# Patient Record
Sex: FEMALE | Race: WHITE | Employment: OTHER | URBAN - METROPOLITAN AREA
[De-identification: names, ages, dates, MRNs, and addresses within clinical notes are randomized per-mention and may not be internally consistent; named-entity substitution may affect disease eponyms.]

---

## 2017-02-02 ENCOUNTER — TELEPHONE (OUTPATIENT)
Dept: FAMILY MEDICINE CLINIC | Facility: CLINIC | Age: 67
End: 2017-02-02

## 2017-02-02 ENCOUNTER — HOSPITAL ENCOUNTER (OUTPATIENT)
Age: 67
Discharge: HOME | End: 2017-02-02
Payer: MEDICARE

## 2017-02-02 VITALS
HEART RATE: 76 BPM | OXYGEN SATURATION: 100 % | SYSTOLIC BLOOD PRESSURE: 169 MMHG | DIASTOLIC BLOOD PRESSURE: 75 MMHG | TEMPERATURE: 98 F | RESPIRATION RATE: 16 BRPM

## 2017-02-02 DIAGNOSIS — S16.1XXA STRAIN OF CERVICAL PORTION OF RIGHT TRAPEZIUS MUSCLE: Primary | ICD-10-CM

## 2017-02-02 DIAGNOSIS — S16.1XXA CERVICAL STRAIN, ACUTE, INITIAL ENCOUNTER: ICD-10-CM

## 2017-02-02 PROCEDURE — 99213 OFFICE O/P EST LOW 20 MIN: CPT

## 2017-02-02 PROCEDURE — 99204 OFFICE O/P NEW MOD 45 MIN: CPT

## 2017-02-02 RX ORDER — NAPROXEN 500 MG/1
500 TABLET ORAL 2 TIMES DAILY PRN
Qty: 30 TABLET | Refills: 0 | Status: SHIPPED | OUTPATIENT
Start: 2017-02-02 | End: 2017-02-06

## 2017-02-02 RX ORDER — CYCLOBENZAPRINE HCL 10 MG
10 TABLET ORAL 3 TIMES DAILY PRN
Qty: 15 TABLET | Refills: 0 | Status: SHIPPED | OUTPATIENT
Start: 2017-02-02 | End: 2017-02-06

## 2017-02-02 NOTE — ED PROVIDER NOTES
Patient Seen in: [facility] In [address]    History   Patient presents with:  Headache (neurologic)    Stated Complaint: behind ear swelling/pain    HPI    57-year-old female who comes in today complaining of right right neck pain that exten BESYLATE 10 MG Oral Tab,  TAKE 1 TABLET BY MOUTH DAILY.  BLOOD PRESSURE       Family History   Problem Relation Age of Onset   • Diabetes Father    • Hypertension Mother          Smoking Status: Never Smoker                      Smokeless Status: Never Used intact, no rashes or abnormal dyspigmentation  Neurologic:   Grossly intact, no cranial nerve deficits, 5/5 symmetric UE and LE strength and tone, gait normal    ED Course   Labs Reviewed - No data to display    MDM   Reviewed with patient if continued hea patient verbalized understanding of the discharge instructions and plan.

## 2017-02-02 NOTE — TELEPHONE ENCOUNTER
Patient states that for the past 2 days she has been having a weird tingling sensation behind her right ear that travels down her neck to her right shoulder.    She says that it is not painful, denies nausea, vomiting, vision changes, chest pain, headache,

## 2017-02-05 ENCOUNTER — MOBILE ENCOUNTER (OUTPATIENT)
Dept: OBGYN UNIT | Facility: HOSPITAL | Age: 67
End: 2017-02-05

## 2017-02-06 ENCOUNTER — OFFICE VISIT (OUTPATIENT)
Dept: FAMILY MEDICINE CLINIC | Facility: CLINIC | Age: 67
End: 2017-02-06

## 2017-02-06 VITALS
DIASTOLIC BLOOD PRESSURE: 82 MMHG | HEART RATE: 82 BPM | WEIGHT: 121 LBS | RESPIRATION RATE: 16 BRPM | HEIGHT: 62 IN | TEMPERATURE: 98 F | BODY MASS INDEX: 22.26 KG/M2 | SYSTOLIC BLOOD PRESSURE: 126 MMHG

## 2017-02-06 DIAGNOSIS — M54.2 CERVICAL PAIN: Primary | ICD-10-CM

## 2017-02-06 DIAGNOSIS — M54.2 NECK PAIN: ICD-10-CM

## 2017-02-06 PROCEDURE — 99213 OFFICE O/P EST LOW 20 MIN: CPT | Performed by: PHYSICIAN ASSISTANT

## 2017-02-06 RX ORDER — METHOCARBAMOL 750 MG/1
750 TABLET, FILM COATED ORAL 3 TIMES DAILY
Qty: 30 TABLET | Refills: 0 | Status: SHIPPED | OUTPATIENT
Start: 2017-02-06 | End: 2017-03-07

## 2017-02-06 NOTE — PROGRESS NOTES
[facility] Internal Medicine Progress Note    CC:  Patient presents with:  Headache: x 1-2 weeks  Neck Pain  Shoulder Pain      HPI:   HPI  UC follow-up for neck/shoulder pain  R sided neck pain for 2 weeks  Can feel the area tightening and relea Mouth/Throat: Oropharynx is clear and moist. No oropharyngeal exudate. Eyes: EOM are normal. Pupils are equal, round, and reactive to light. Cardiovascular: Normal rate, regular rhythm and normal heart sounds.   Exam reveals no gallop and no friction ru PAC (premature atrial contraction)     SVT (supraventricular tachycardia) (HCC)     TOD (obstructive sleep apnea)     Foot fracture, left     Urine frequency     Ankle swelling     Sinusitis     Abnormal LFTs     Arthritis of right knee

## 2017-02-06 NOTE — PATIENT INSTRUCTIONS
Thank you for choosing Horald Mcardle, PA-C at [place] [address]  To Do: Janelle Jaimes  1. Pt to start physical therapy  2. Begin medications, OTC acetaminophen/ibuprofen  3.  Pt to follow-up in 1 month    • Please signup for MY CHART, which is yaritza you healthier and to improve your quality of life.     Referrals, and Radiology Information:    If your insurance requires a referral to a specialist, please allow 5 business days to process your referral request.    If Eva Prakash PA-C orders a CT or

## 2017-02-13 ENCOUNTER — APPOINTMENT (OUTPATIENT)
Dept: PHYSICAL THERAPY | Age: 67
End: 2017-02-13
Attending: PHYSICIAN ASSISTANT
Payer: MEDICARE

## 2017-02-13 ENCOUNTER — TELEPHONE (OUTPATIENT)
Dept: PHYSICAL THERAPY | Age: 67
End: 2017-02-13

## 2017-02-15 ENCOUNTER — OFFICE VISIT (OUTPATIENT)
Dept: PHYSICAL THERAPY | Age: 67
End: 2017-02-15
Attending: PHYSICIAN ASSISTANT
Payer: MEDICARE

## 2017-02-15 DIAGNOSIS — M54.2 CERVICAL PAIN: Primary | ICD-10-CM

## 2017-02-15 PROCEDURE — 97140 MANUAL THERAPY 1/> REGIONS: CPT | Performed by: PHYSICAL THERAPIST

## 2017-02-15 PROCEDURE — 97161 PT EVAL LOW COMPLEX 20 MIN: CPT | Performed by: PHYSICAL THERAPIST

## 2017-02-15 NOTE — PROGRESS NOTES
SPINE EVALUATION:   Referring Physician: Robert Vizcaino PA-C  Diagnosis: Cervical pain (M54.2) Date of Service: 2/15/2017     PATIENT SUMMARY   Doris Burkett is a 61year old y/o female who presents to therapy today with complaints of right sided and myotomes WNL    cervica ROM:   Flexion: 45 deg stretching pain  Extension: 35 deg right sided neck pain  Sidebending: R 30 deg L 25 deg pain  Rotation: R 80 ; L 80 deg     Palpation: moderate tenderness to right sided occipital musculature.     Strength visits)  · Pt will be independent and compliant with comprehensive HEP to maintain progress achieved in PT (4 visits)    Frequency / Duration: Patient will be seen for 1-2 x/week or a total of 4 visits over a 90 day period.  Treatment will include: Manual T

## 2017-02-20 ENCOUNTER — APPOINTMENT (OUTPATIENT)
Dept: PHYSICAL THERAPY | Age: 67
End: 2017-02-20
Attending: PHYSICIAN ASSISTANT
Payer: MEDICARE

## 2017-02-22 ENCOUNTER — APPOINTMENT (OUTPATIENT)
Dept: PHYSICAL THERAPY | Age: 67
End: 2017-02-22
Attending: PHYSICIAN ASSISTANT
Payer: MEDICARE

## 2017-03-01 ENCOUNTER — APPOINTMENT (OUTPATIENT)
Dept: PHYSICAL THERAPY | Age: 67
End: 2017-03-01
Attending: PHYSICIAN ASSISTANT
Payer: MEDICARE

## 2017-03-07 ENCOUNTER — HOSPITAL ENCOUNTER (OUTPATIENT)
Dept: GENERAL RADIOLOGY | Age: 67
Discharge: HOME | End: 2017-03-07
Attending: INTERNAL MEDICINE
Payer: MEDICARE

## 2017-03-07 ENCOUNTER — OFFICE VISIT (OUTPATIENT)
Dept: FAMILY MEDICINE CLINIC | Facility: CLINIC | Age: 67
End: 2017-03-07

## 2017-03-07 VITALS
RESPIRATION RATE: 18 BRPM | DIASTOLIC BLOOD PRESSURE: 70 MMHG | HEART RATE: 108 BPM | TEMPERATURE: 99 F | HEIGHT: 62 IN | BODY MASS INDEX: 21.53 KG/M2 | WEIGHT: 117 LBS | SYSTOLIC BLOOD PRESSURE: 130 MMHG

## 2017-03-07 DIAGNOSIS — R50.9 FEVER, UNSPECIFIED FEVER CAUSE: ICD-10-CM

## 2017-03-07 DIAGNOSIS — R50.9 FEVER, UNSPECIFIED FEVER CAUSE: Primary | ICD-10-CM

## 2017-03-07 DIAGNOSIS — R05.9 COUGH: ICD-10-CM

## 2017-03-07 PROCEDURE — 87798 DETECT AGENT NOS DNA AMP: CPT | Performed by: INTERNAL MEDICINE

## 2017-03-07 PROCEDURE — 71020 XR CHEST PA + LAT CHEST (CPT=71020): CPT

## 2017-03-07 PROCEDURE — 99213 OFFICE O/P EST LOW 20 MIN: CPT | Performed by: INTERNAL MEDICINE

## 2017-03-07 PROCEDURE — 87502 INFLUENZA DNA AMP PROBE: CPT | Performed by: INTERNAL MEDICINE

## 2017-03-07 RX ORDER — AZITHROMYCIN 250 MG/1
TABLET, FILM COATED ORAL
Qty: 6 TABLET | Refills: 0 | Status: SHIPPED | OUTPATIENT
Start: 2017-03-07 | End: 2017-06-07

## 2017-03-07 NOTE — PROGRESS NOTES
[facility] Internal Medicine Office Note  Chief Complaint:   Patient presents with:  Cough: x 1week  Fever: x 1week       HPI:   This is a 61year old female coming in for cough and fever for 1 week.    +rhinorrhea and congestion  +ears popping  + eyes every 6 (six) hours. Disp: 1 Bottle Rfl: 0   GALANTAMINE HYDROBROMIDE ER 24 MG Oral Capsule SR 24 Hr TAKE 1 CAPSULE (24 MG TOTAL) BY MOUTH DAILY WITH BREAKFAST. Disp: 30 capsule Rfl: 12   Aspirin (ASPIR-81 OR) Take  by mouth.  Disp:  Rfl:          REVI sounds but explained we will stop abx if influenza swab is positive. -     XR CHEST PA + LAT CHEST (CPT=71020); Future  -     Influenza A/B PCR [E]; Future    Cough  -     XR CHEST PA + LAT CHEST (CPT=71020);  Future    Other orders  -     azithromycin 25

## 2017-03-07 NOTE — PATIENT INSTRUCTIONS
Thank you for choosing Mac Isabel MD at [place] [address]  To Do: Teddy Gee  1.  Chest X-ray today    • Please signup for MY CHART, which is electronic access to your record if you have not done so.  All your results will post on there.  Https business days to contact you regarding any testing results. Refill policies:   Allow 3 business days for refills; controlled substances may take longer and must be picked up from the office in person.   Narcotic medications can only be filled in 30 day i

## 2017-03-08 ENCOUNTER — TELEPHONE (OUTPATIENT)
Dept: FAMILY MEDICINE CLINIC | Facility: CLINIC | Age: 67
End: 2017-03-08

## 2017-03-08 ENCOUNTER — APPOINTMENT (OUTPATIENT)
Dept: PHYSICAL THERAPY | Age: 67
End: 2017-03-08
Attending: PHYSICIAN ASSISTANT
Payer: MEDICARE

## 2017-03-08 NOTE — TELEPHONE ENCOUNTER
Pt had x ray completed yesterday 3/7/17, it has not been resulted by the provider yet. Please call pt / daughter and let her know we will call with the results / instructions as soon as we receive them from the provider. Thank you.

## 2017-03-10 ENCOUNTER — APPOINTMENT (OUTPATIENT)
Dept: PHYSICAL THERAPY | Age: 67
End: 2017-03-10
Attending: PHYSICIAN ASSISTANT
Payer: MEDICARE

## 2017-03-10 ENCOUNTER — TELEPHONE (OUTPATIENT)
Dept: FAMILY MEDICINE CLINIC | Facility: CLINIC | Age: 67
End: 2017-03-10

## 2017-03-10 NOTE — TELEPHONE ENCOUNTER
Per earlier call from [facility] (daughter - ASIA KEITH). Patient has memory issues, so [facility] is asking that all calls be directed to her at [phone]. [facility] states that she has POA.   However, without a md declaration that the patient is not capable of ta

## 2017-05-22 ENCOUNTER — CHARTING TRANS (OUTPATIENT)
Dept: OTHER | Age: 67
End: 2017-05-22

## 2017-05-31 ENCOUNTER — TELEPHONE (OUTPATIENT)
Dept: FAMILY MEDICINE CLINIC | Facility: CLINIC | Age: 67
End: 2017-05-31

## 2017-05-31 NOTE — TELEPHONE ENCOUNTER
Patient states that she is not sure if she is having allergies, states she has an upset stomach and headache, nasal congestion, sore throat, pt states this started 3-4 days. Patient states she has taken tylenol and nasal spray.  Patient scheduled an appt to

## 2017-05-31 NOTE — TELEPHONE ENCOUNTER
Patients daughter returned call was upset that an appointment was set up with another provider, pts daughter states [de-identified] my mother can not see Dr Pratima Mueller than they will seek another provider who has better availability\" Informed patients daughter we would dis

## 2017-06-01 NOTE — TELEPHONE ENCOUNTER
Pts daughter called back, offered for pt to see PA, she declined apt. Advised her next available is in early July. Daughter sts she may have to look else where for another MD.  Call ended.

## 2017-06-01 NOTE — TELEPHONE ENCOUNTER
You can schedule her further out    Or I accept the arrangement and she can find another provider i'm good with that option thanks

## 2017-06-01 NOTE — TELEPHONE ENCOUNTER
Attempted to contact Aleshia to discuss phone rings for several minutes before disconnecting, unable to leave a message.

## 2017-06-07 ENCOUNTER — TELEPHONE (OUTPATIENT)
Dept: FAMILY MEDICINE CLINIC | Facility: CLINIC | Age: 67
End: 2017-06-07

## 2017-06-07 ENCOUNTER — OFFICE VISIT (OUTPATIENT)
Dept: FAMILY MEDICINE CLINIC | Facility: CLINIC | Age: 67
End: 2017-06-07

## 2017-06-07 VITALS
SYSTOLIC BLOOD PRESSURE: 110 MMHG | HEIGHT: 62 IN | RESPIRATION RATE: 16 BRPM | HEART RATE: 58 BPM | DIASTOLIC BLOOD PRESSURE: 60 MMHG | BODY MASS INDEX: 21.35 KG/M2 | OXYGEN SATURATION: 99 % | WEIGHT: 116 LBS

## 2017-06-07 DIAGNOSIS — F02.80 EARLY ONSET ALZHEIMER'S DEMENTIA WITHOUT BEHAVIORAL DISTURBANCE (HCC): ICD-10-CM

## 2017-06-07 DIAGNOSIS — I10 ESSENTIAL HYPERTENSION: ICD-10-CM

## 2017-06-07 DIAGNOSIS — R79.89 ABNORMAL LFTS: ICD-10-CM

## 2017-06-07 DIAGNOSIS — Z12.11 COLON CANCER SCREENING: ICD-10-CM

## 2017-06-07 DIAGNOSIS — M54.50 ACUTE RIGHT-SIDED LOW BACK PAIN WITHOUT SCIATICA: Primary | ICD-10-CM

## 2017-06-07 DIAGNOSIS — G30.0 EARLY ONSET ALZHEIMER'S DEMENTIA WITHOUT BEHAVIORAL DISTURBANCE (HCC): ICD-10-CM

## 2017-06-07 DIAGNOSIS — K76.9 HEPATIC LESION: ICD-10-CM

## 2017-06-07 PROCEDURE — 99214 OFFICE O/P EST MOD 30 MIN: CPT | Performed by: FAMILY MEDICINE

## 2017-06-07 RX ORDER — CYCLOBENZAPRINE HCL 5 MG
5 TABLET ORAL NIGHTLY
Qty: 30 TABLET | Refills: 0 | Status: SHIPPED | OUTPATIENT
Start: 2017-06-07 | End: 2017-07-07

## 2017-06-07 RX ORDER — PREDNISONE 20 MG/1
20 TABLET ORAL DAILY
Qty: 5 TABLET | Refills: 0 | Status: SHIPPED | OUTPATIENT
Start: 2017-06-07 | End: 2017-06-12

## 2017-06-07 NOTE — PROGRESS NOTES
[facility] Family Medicine Office Note  Chief Complaint:   Patient presents with:  Establish Care: estab care, lower back pain started yesterday       HPI:   This is a 61year old female coming in for  HPI  Back pain   The patient complaints of b Mother      Allergies:    Diovan [Valsartan]        Sulfa Antibiotics           Comment:tachycardia  Current Meds:    Current Outpatient Prescriptions:  Cyclobenzaprine HCl 5 MG Oral Tab Take 1 tablet (5 mg total) by mouth nightly.  Disp: 30 tablet Rfl: 0 Effort normal and breath sounds normal. She has no wheezes. She has no rales. Abdominal: Soft. Bowel sounds are normal. There is no tenderness.    Musculoskeletal:        Right hip: Normal.        Left hip: Normal.        Lumbar back: She exhibits no defo questions, complications, allergies, or worsening or changing symptoms. Patient is to call with any side effects or complications from the treatments as a result of today.      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

## 2017-06-07 NOTE — PATIENT INSTRUCTIONS
Back Exercises: Back Press    Do this exercise on your hands and knees. Keep your knees under your hips and your hands under your shoulders. Keep your spine in a neutral position (not arched or sagging).  Be sure to maintain your neck’s natural curve:  · © 9691-2810 The [address] [place] [address], [address] [place] [address] [place]. All rights reserved. This information is not intended as a substitute for professional medical care. Always follow your healthcare professional's instructions.         Back Ex

## 2017-06-08 NOTE — TELEPHONE ENCOUNTER
I called Christofer regarding a PA for Cyclobenzaprine. Cost will be 31$ if not through insurance. She gave me phone # [phone], ID [phone]. I called [address] spoke with Meena 19 did PA over the phone.   Will await decision, she states it will take 67

## 2017-06-09 NOTE — TELEPHONE ENCOUNTER
Fax received from Barbra Shantanu asking additional questions regarding pt's PA. Questions answered & form faxed back to [facility]. Confirmation received. Will await decision.

## 2017-06-22 NOTE — TELEPHONE ENCOUNTER
PSR: When patient returns call, please let her know (if she doesn't already) that cyclobenzaprine approved by insurance. Thank you! Fax received from Ladonna Alonso that cyclobenzaprine approved. Left message for patient to call back.  Does not appear that she seven

## 2017-06-30 ENCOUNTER — OFFICE VISIT (OUTPATIENT)
Dept: FAMILY MEDICINE CLINIC | Facility: CLINIC | Age: 67
End: 2017-06-30

## 2017-06-30 ENCOUNTER — LAB ENCOUNTER (OUTPATIENT)
Dept: LAB | Age: 67
End: 2017-06-30
Attending: FAMILY MEDICINE
Payer: MEDICARE

## 2017-06-30 ENCOUNTER — TELEPHONE (OUTPATIENT)
Dept: FAMILY MEDICINE CLINIC | Facility: CLINIC | Age: 67
End: 2017-06-30

## 2017-06-30 ENCOUNTER — HOSPITAL ENCOUNTER (OUTPATIENT)
Dept: ULTRASOUND IMAGING | Age: 67
Discharge: HOME | End: 2017-06-30
Attending: FAMILY MEDICINE
Payer: MEDICARE

## 2017-06-30 VITALS
HEART RATE: 70 BPM | TEMPERATURE: 98 F | RESPIRATION RATE: 16 BRPM | DIASTOLIC BLOOD PRESSURE: 80 MMHG | SYSTOLIC BLOOD PRESSURE: 136 MMHG | OXYGEN SATURATION: 99 % | BODY MASS INDEX: 21 KG/M2 | WEIGHT: 115 LBS

## 2017-06-30 DIAGNOSIS — R79.89 ABNORMAL LFTS: ICD-10-CM

## 2017-06-30 DIAGNOSIS — I10 ESSENTIAL HYPERTENSION: ICD-10-CM

## 2017-06-30 DIAGNOSIS — G30.0 EARLY ONSET ALZHEIMER'S DEMENTIA WITHOUT BEHAVIORAL DISTURBANCE (HCC): ICD-10-CM

## 2017-06-30 DIAGNOSIS — F02.80 EARLY ONSET ALZHEIMER'S DEMENTIA WITHOUT BEHAVIORAL DISTURBANCE (HCC): ICD-10-CM

## 2017-06-30 DIAGNOSIS — H81.10 BPPV (BENIGN PAROXYSMAL POSITIONAL VERTIGO), UNSPECIFIED LATERALITY: Primary | ICD-10-CM

## 2017-06-30 DIAGNOSIS — K76.9 HEPATIC LESION: ICD-10-CM

## 2017-06-30 DIAGNOSIS — R42 DIZZINESS: ICD-10-CM

## 2017-06-30 LAB
ALBUMIN SERPL-MCNC: 3.7 G/DL (ref 3.5–4.8)
ALP LIVER SERPL-CCNC: 104 U/L (ref 50–130)
ALT SERPL-CCNC: 20 U/L (ref 14–54)
AST SERPL-CCNC: 16 U/L (ref 15–41)
BASOPHILS # BLD AUTO: 0.02 X10(3) UL (ref 0–0.1)
BASOPHILS NFR BLD AUTO: 0.4 %
BILIRUB DIRECT SERPL-MCNC: 0.1 MG/DL (ref 0.1–0.5)
BILIRUB SERPL-MCNC: 0.5 MG/DL (ref 0.1–2)
BUN BLD-MCNC: 24 MG/DL (ref 8–20)
CALCIUM BLD-MCNC: 8.9 MG/DL (ref 8.3–10.3)
CHLORIDE: 108 MMOL/L (ref 101–111)
CO2: 27 MMOL/L (ref 22–32)
CREAT BLD-MCNC: 0.81 MG/DL (ref 0.55–1.02)
EOSINOPHIL # BLD AUTO: 0.22 X10(3) UL (ref 0–0.3)
EOSINOPHIL NFR BLD AUTO: 4 %
ERYTHROCYTE [DISTWIDTH] IN BLOOD BY AUTOMATED COUNT: 14.1 % (ref 11.5–16)
GLUCOSE BLD-MCNC: 91 MG/DL (ref 70–99)
HCT VFR BLD AUTO: 41.7 % (ref 34–50)
HGB BLD-MCNC: 13.5 G/DL (ref 12–16)
IMMATURE GRANULOCYTE COUNT: 0.01 X10(3) UL (ref 0–1)
IMMATURE GRANULOCYTE RATIO %: 0.2 %
LYMPHOCYTES # BLD AUTO: 2.13 X10(3) UL (ref 0.9–4)
LYMPHOCYTES NFR BLD AUTO: 38.9 %
M PROTEIN MFR SERPL ELPH: 6.9 G/DL (ref 6.1–8.3)
MCH RBC QN AUTO: 28.1 PG (ref 27–33.2)
MCHC RBC AUTO-ENTMCNC: 32.4 G/DL (ref 31–37)
MCV RBC AUTO: 86.9 FL (ref 81–100)
MONOCYTES # BLD AUTO: 0.54 X10(3) UL (ref 0.1–0.6)
MONOCYTES NFR BLD AUTO: 9.9 %
NEUTROPHIL ABS PRELIM: 2.56 X10 (3) UL (ref 1.3–6.7)
NEUTROPHILS # BLD AUTO: 2.56 X10(3) UL (ref 1.3–6.7)
NEUTROPHILS NFR BLD AUTO: 46.6 %
PLATELET # BLD AUTO: 227 10(3)UL (ref 150–450)
POTASSIUM SERPL-SCNC: 4.5 MMOL/L (ref 3.6–5.1)
RBC # BLD AUTO: 4.8 X10(6)UL (ref 3.8–5.1)
RED CELL DISTRIBUTION WIDTH-SD: 45.1 FL (ref 35.1–46.3)
SODIUM SERPL-SCNC: 142 MMOL/L (ref 136–144)
TSI SER-ACNC: 1.44 MIU/ML (ref 0.35–5.5)
WBC # BLD AUTO: 5.5 X10(3) UL (ref 4–13)

## 2017-06-30 PROCEDURE — 99213 OFFICE O/P EST LOW 20 MIN: CPT | Performed by: PHYSICIAN ASSISTANT

## 2017-06-30 PROCEDURE — 76700 US EXAM ABDOM COMPLETE: CPT | Performed by: FAMILY MEDICINE

## 2017-06-30 PROCEDURE — 82962 GLUCOSE BLOOD TEST: CPT | Performed by: PHYSICIAN ASSISTANT

## 2017-06-30 PROCEDURE — 36415 COLL VENOUS BLD VENIPUNCTURE: CPT

## 2017-06-30 PROCEDURE — 80076 HEPATIC FUNCTION PANEL: CPT

## 2017-06-30 PROCEDURE — 80048 BASIC METABOLIC PNL TOTAL CA: CPT

## 2017-06-30 PROCEDURE — 85025 COMPLETE CBC W/AUTO DIFF WBC: CPT

## 2017-06-30 PROCEDURE — 84443 ASSAY THYROID STIM HORMONE: CPT

## 2017-06-30 RX ORDER — MECLIZINE HYDROCHLORIDE 25 MG/1
25 TABLET ORAL 3 TIMES DAILY PRN
Qty: 21 TABLET | Refills: 0 | Status: SHIPPED | OUTPATIENT
Start: 2017-06-30 | End: 2017-07-07

## 2017-06-30 RX ORDER — FLUTICASONE PROPIONATE 50 MCG
2 SPRAY, SUSPENSION (ML) NASAL DAILY
Qty: 1 INHALER | Refills: 0 | Status: SHIPPED | OUTPATIENT
Start: 2017-06-30 | End: 2017-07-07

## 2017-06-30 NOTE — PATIENT INSTRUCTIONS
-If you have any chest pain, shortness of breath, or palpitations, or any worsening symptoms, go to ER immediately.  -Follow up with ENT       Benign Paroxysmal Positional Vertigo  Benign paroxysmal positional vertigo (BPPV) is a problem with the inner ear The health care provider will ask about your symptoms and your medical history. He or she will examine you. You may have hearing and balance tests. As part of the exam, your health care provider may have you move your head and body in certain ways.  If you

## 2017-06-30 NOTE — TELEPHONE ENCOUNTER
Refer to [facility] home health for   1.  Physical therapy    Dx: fall risk, BPPV

## 2017-06-30 NOTE — PROGRESS NOTES
Patient reports dizziness x 1.5week. Patient was laying down and turned and started again. Patient does report a lot  Of \"popping in her ears. \"  Patient reports when she turns her head she gets really dizzy.   Patient reports that she goes from a lyin

## 2017-06-30 NOTE — TELEPHONE ENCOUNTER
Demographics sheet and [facility] referral form completed and pending MD signature. Will kaden for f/u and fax to [facility] once signed.

## 2017-06-30 NOTE — PROGRESS NOTES
CHIEF COMPLAINT:     Patient presents with:  Dizziness: Almost 2 weeks.  :      HPI:     Jae Machado is a 61year old female presents with complaints of dizziness and ear popping. Patient has had symptoms for 10 days.    Patient has had symptoms like • TOD (obstructive sleep apnea) 4/10/2015   • S/P right knee arthroscopy       Social History:  Smoking status: Never Smoker                                                              Smokeless tobacco: Never Used                      Alcohol use:  No PSYCH:  Speech, mood and affect are appropriate.        Orthostatic Vitals:   Lying:      /80  HR 76     Sitting:     /80  HR 72   Standing: /80  HR 80    Blood sugar 109    ASSESSMENT AND PLAN:   ASSESSMENT:   Bppv (benign paroxysmal posi Benign paroxysmal positional vertigo (BPPV) is a problem with the inner ear. The inner ear contains the vestibular system. This system is what helps you keep your balance. BPPV causes a feeling of spinning. It is a common problem of the vestibular system. Your health care provider may try to move the calcium crystals. This is done by having you move your head and neck in certain ways. This treatment is safe and often works well. You may also be told to do these movements at home.  You may still have vertigo

## 2017-07-01 ENCOUNTER — HOSPITAL ENCOUNTER (EMERGENCY)
Age: 67
Discharge: HOME | End: 2017-07-01
Attending: EMERGENCY MEDICINE
Payer: MEDICARE

## 2017-07-01 VITALS
HEIGHT: 61 IN | BODY MASS INDEX: 21.71 KG/M2 | OXYGEN SATURATION: 100 % | SYSTOLIC BLOOD PRESSURE: 182 MMHG | HEART RATE: 80 BPM | RESPIRATION RATE: 16 BRPM | TEMPERATURE: 98 F | WEIGHT: 115 LBS | DIASTOLIC BLOOD PRESSURE: 81 MMHG

## 2017-07-01 DIAGNOSIS — H81.10 BPPV (BENIGN PAROXYSMAL POSITIONAL VERTIGO), UNSPECIFIED LATERALITY: Primary | ICD-10-CM

## 2017-07-01 PROCEDURE — 99283 EMERGENCY DEPT VISIT LOW MDM: CPT | Performed by: EMERGENCY MEDICINE

## 2017-07-01 RX ORDER — DIAZEPAM 2 MG/1
2 TABLET ORAL 3 TIMES DAILY PRN
Qty: 20 TABLET | Refills: 0 | Status: SHIPPED | OUTPATIENT
Start: 2017-07-01 | End: 2017-07-07

## 2017-07-01 NOTE — ED INITIAL ASSESSMENT (HPI)
Pt states has been dizzy for 2 weeks, intermittent, states has had vertigo in the past, dizziness worse when changing positions. Went to walk in clinic last night, was told to take dramamine and states feels better when walking today.

## 2017-07-01 NOTE — ED PROVIDER NOTES
Patient Seen in: [facility] In [place]    History   Patient presents with:  Dizziness (neurologic)    Stated Complaint: \"DIZZY X 2 WEEKS\"    HPI    62-year-old female, history of Alzheimer's and according to the daughter BPPV, here Father    • Hypertension Mother        Smoking status: Never Smoker                                                              Smokeless tobacco: Never Used                      Alcohol use:  No                Review of Systems    Positive for stated comp to nose. Normal heel to shin. Normal gait. Sensation is normal in the lower extremities and not diminished. It is intact to fine touch. There is no lower extremity drift.     There is full strength with hip flexion, knee flexion and extension, ankl

## 2017-07-03 ENCOUNTER — TELEPHONE (OUTPATIENT)
Dept: FAMILY MEDICINE CLINIC | Facility: CLINIC | Age: 67
End: 2017-07-03

## 2017-07-03 NOTE — TELEPHONE ENCOUNTER
Please see above message. Also, see other telephone encounter. They are unable to start until Wednesday if patient agrees. Please advise. Thank you!

## 2017-07-05 NOTE — TELEPHONE ENCOUNTER
[facility]'s VM.

## 2017-07-05 NOTE — TELEPHONE ENCOUNTER
Cancel home health referral. Daughter does not want at this time. Concerned due to patient's dementia that she will not remember tx provided and additionally be uncomfortable with strangers present at home.

## 2017-07-07 ENCOUNTER — APPOINTMENT (OUTPATIENT)
Dept: LAB | Age: 67
End: 2017-07-07
Attending: FAMILY MEDICINE
Payer: MEDICARE

## 2017-07-07 ENCOUNTER — OFFICE VISIT (OUTPATIENT)
Dept: FAMILY MEDICINE CLINIC | Facility: CLINIC | Age: 67
End: 2017-07-07

## 2017-07-07 VITALS
BODY MASS INDEX: 20.8 KG/M2 | WEIGHT: 113 LBS | OXYGEN SATURATION: 98 % | TEMPERATURE: 98 F | HEART RATE: 68 BPM | DIASTOLIC BLOOD PRESSURE: 74 MMHG | HEIGHT: 62 IN | RESPIRATION RATE: 16 BRPM | SYSTOLIC BLOOD PRESSURE: 110 MMHG

## 2017-07-07 DIAGNOSIS — D13.4 BENIGN NEOPLASM OF LIVER: ICD-10-CM

## 2017-07-07 DIAGNOSIS — Z12.11 COLON CANCER SCREENING: ICD-10-CM

## 2017-07-07 DIAGNOSIS — R63.4 WEIGHT LOSS: Primary | ICD-10-CM

## 2017-07-07 DIAGNOSIS — K76.9 HEPATIC LESION: ICD-10-CM

## 2017-07-07 DIAGNOSIS — Z12.31 ENCOUNTER FOR SCREENING MAMMOGRAM FOR MALIGNANT NEOPLASM OF BREAST: ICD-10-CM

## 2017-07-07 LAB — AFP TUMOR MARKER: 6.6 NG/ML (ref ?–8)

## 2017-07-07 PROCEDURE — 82105 ALPHA-FETOPROTEIN SERUM: CPT

## 2017-07-07 PROCEDURE — 36415 COLL VENOUS BLD VENIPUNCTURE: CPT

## 2017-07-07 PROCEDURE — 99214 OFFICE O/P EST MOD 30 MIN: CPT | Performed by: FAMILY MEDICINE

## 2017-07-07 RX ORDER — MIRTAZAPINE 15 MG/1
15 TABLET, FILM COATED ORAL NIGHTLY
Qty: 30 TABLET | Refills: 0 | Status: SHIPPED | OUTPATIENT
Start: 2017-07-07 | End: 2017-10-24

## 2017-07-09 NOTE — PROGRESS NOTES
[facility] Family Medicine Office Note  Chief Complaint:   Patient presents with: Follow - Up: weight loss and dizzy spells also go over results       HPI:   This is a 61year old female coming in for  HPI  Follow up   1.  Vertigo  - was seen in Outpatient Prescriptions:  mirtazapine 15 MG Oral Tab Take 1 tablet (15 mg total) by mouth nightly. Disp: 30 tablet Rfl: 0   GALANTAMINE HYDROBROMIDE ER 24 MG Oral Capsule SR 24 Hr TAKE 1 CAPSULE (24 MG TOTAL) BY MOUTH DAILY WITH BREAKFAST.  Disp: 30 capsul has no rales. Lymphadenopathy:     She has no cervical adenopathy. Neurological: She is alert. She has normal reflexes. No cranial nerve deficit. Coordination normal.   Psychiatric: She has a normal mood and affect.          ASSESSMENT AND PLAN:   1. We

## 2017-10-06 ENCOUNTER — TELEPHONE (OUTPATIENT)
Dept: FAMILY MEDICINE CLINIC | Facility: CLINIC | Age: 67
End: 2017-10-06

## 2017-10-06 NOTE — TELEPHONE ENCOUNTER
Dr.Jabbar- [facility] is calling to speak with a nurse in regards to a refill request for GALANTAMINE HYDROBROMIDE ER 24 MG Oral Capsule SR 24 . Patient is waiting at the pharmacy. Please advise.

## 2017-10-06 NOTE — TELEPHONE ENCOUNTER
Informed the pharmacy that this patient no longer has a PCP at this office. She will need to contact patient's current PCP office.

## 2017-10-09 NOTE — TELEPHONE ENCOUNTER
Patient's daughter call for a refill for her mom for  GALANTAMINE HYDROBROMIDE ER 24 MG Oral Capsule SR 24 Hr 30 capsule     Please advise pt when completed.

## 2017-10-10 RX ORDER — GALANTAMINE HYDROBROMIDE 24 MG/1
CAPSULE, EXTENDED RELEASE ORAL
Qty: 30 CAPSULE | Refills: 5 | Status: SHIPPED | OUTPATIENT
Start: 2017-10-10 | End: 2018-05-06

## 2017-10-16 ENCOUNTER — HOSPITAL ENCOUNTER (OUTPATIENT)
Dept: MRI IMAGING | Age: 67
Discharge: HOME | End: 2017-10-16
Payer: MEDICARE

## 2017-10-16 DIAGNOSIS — F02.80 ALZHEIMER'S DISEASE, EARLY ONSET (HCC): ICD-10-CM

## 2017-10-16 DIAGNOSIS — R42 DIZZINESS: ICD-10-CM

## 2017-10-16 DIAGNOSIS — F02.80 DEMENTIA OF THE ALZHEIMER'S TYPE WITHOUT BEHAVIORAL DISTURBANCE (HCC): ICD-10-CM

## 2017-10-16 DIAGNOSIS — G30.0 ALZHEIMER'S DISEASE, EARLY ONSET (HCC): ICD-10-CM

## 2017-10-16 DIAGNOSIS — G30.9 DEMENTIA OF THE ALZHEIMER'S TYPE WITHOUT BEHAVIORAL DISTURBANCE (HCC): ICD-10-CM

## 2017-10-16 PROCEDURE — 70551 MRI BRAIN STEM W/O DYE: CPT | Performed by: PHYSICIAN ASSISTANT

## 2017-10-16 PROCEDURE — 70551 MRI BRAIN STEM W/O DYE: CPT

## 2017-10-19 ENCOUNTER — HOSPITAL ENCOUNTER (OUTPATIENT)
Dept: MAMMOGRAPHY | Age: 67
Discharge: HOME | End: 2017-10-19
Attending: FAMILY MEDICINE
Payer: MEDICARE

## 2017-10-19 ENCOUNTER — TELEPHONE (OUTPATIENT)
Dept: FAMILY MEDICINE CLINIC | Facility: CLINIC | Age: 67
End: 2017-10-19

## 2017-10-19 DIAGNOSIS — Z12.31 ENCOUNTER FOR SCREENING MAMMOGRAM FOR MALIGNANT NEOPLASM OF BREAST: ICD-10-CM

## 2017-10-19 PROCEDURE — 77067 SCR MAMMO BI INCL CAD: CPT | Performed by: FAMILY MEDICINE

## 2017-10-25 RX ORDER — MIRTAZAPINE 15 MG/1
TABLET, FILM COATED ORAL
Qty: 30 TABLET | Refills: 5 | Status: SHIPPED | OUTPATIENT
Start: 2017-10-25 | End: 2017-10-27

## 2017-10-25 NOTE — TELEPHONE ENCOUNTER
Medication(s) to Refill:   Pending Prescriptions Disp Refills    MIRTAZAPINE 15 MG Oral Tab [Pharmacy Med Name: MIRTAZAPINE 15MG TABLETS] 30 tablet 0     Sig: TAKE 1 TABLET(15 MG) BY MOUTH EVERY NIGHT           Last Time Medication was Filled: 7/7/2017

## 2017-10-27 ENCOUNTER — APPOINTMENT (OUTPATIENT)
Dept: GENERAL RADIOLOGY | Age: 67
End: 2017-10-27
Attending: EMERGENCY MEDICINE
Payer: MEDICARE

## 2017-10-27 PROCEDURE — 71020 XR CHEST PA + LAT CHEST (CPT=71020): CPT | Performed by: EMERGENCY MEDICINE

## 2017-10-27 NOTE — ED PROVIDER NOTES
Patient Seen in: [facility] In [place]    History   Patient presents with:  Hypertension (cardiovascular)  Rash Skin Problem (integumentary)    Stated Complaint: htn and finger discoloration     HPI    53-year-old woman with a h (36.5 °C) [10/27/17 1308]  Temp src: Oral [10/27/17 1308]  SpO2: 99 % [10/27/17 1308]  O2 Device: None (Room air) [10/27/17 1308]    Current:BP (!) 166/63   Pulse 62   Temp 97.7 °F (36.5 °C) (Temporal)   Resp 17   Ht 154.9 cm (5' 1\")   Wt 51.3 kg   LMP  ( ============================================================  ED Course  ------------------------------------------------------------  MDM     Patient given warm pack to her left hand.   She is accompanied by her daughter who is an occupational therapis

## 2017-10-27 NOTE — ED NOTES
Unable to insert iv 3 attempts, pt wrapped in warm blanket and given warm packs, pt rready for xray and md aware

## 2017-12-05 ENCOUNTER — TELEPHONE (OUTPATIENT)
Dept: FAMILY MEDICINE CLINIC | Facility: CLINIC | Age: 67
End: 2017-12-05

## 2018-02-28 ENCOUNTER — OFFICE VISIT (OUTPATIENT)
Dept: FAMILY MEDICINE CLINIC | Facility: CLINIC | Age: 68
End: 2018-02-28

## 2018-02-28 VITALS
SYSTOLIC BLOOD PRESSURE: 126 MMHG | OXYGEN SATURATION: 99 % | RESPIRATION RATE: 16 BRPM | HEIGHT: 62 IN | HEART RATE: 62 BPM | TEMPERATURE: 98 F | DIASTOLIC BLOOD PRESSURE: 64 MMHG | WEIGHT: 113 LBS | BODY MASS INDEX: 20.8 KG/M2

## 2018-02-28 DIAGNOSIS — H11.31 SUBCONJUNCTIVAL HEMORRHAGE OF RIGHT EYE: ICD-10-CM

## 2018-02-28 DIAGNOSIS — S03.00XA TMJ (DISLOCATION OF TEMPOROMANDIBULAR JOINT), INITIAL ENCOUNTER: Primary | ICD-10-CM

## 2018-02-28 PROCEDURE — 99214 OFFICE O/P EST MOD 30 MIN: CPT | Performed by: PHYSICIAN ASSISTANT

## 2018-03-01 NOTE — PATIENT INSTRUCTIONS
Subconjunctival hemorrhage  -If see red, have eye pain, itchy eye, blurred vision, discharge, or any other worsening symptoms, must see eye doctor or ER immediately  TMJ  -Alternate ice and warm compress  -Tylenol   -SOFT FOODS  -Follow up with PCP with an The main symptom is a red patch on the eye. You may notice it after waking up in the morning. In most cases just one eye will have a hemorrhage. It usually happens once and then goes away. But some health conditions may cause repeat hemorrhages.  You may fe Date Last Reviewed: 2/1/2017  © 9096-7813 The [employer]. [address], [facility] [place]. All rights reserved. This information is not intended as a substitute for professional medical care.  Always follow your healthcare professional' Treatment can help relieve your current condition. But TMD symptoms may return over time. You can avoid future problems by maintaining the health of your jaw:  · Stay away foods and habits that make your symptoms worse.   · Lower the stress level in your li · Muscle relaxants. These treat myofascial pain. This is pain that occurs in the soft tissues or muscles around the TMJ. Muscle relaxants help ease muscle tension. This reduces pressure on the TMJ from tight jaw muscles.   · Antidepressants. These can be us · Rest and gentle exercise. This is done to increase range of motion. One common exercise is to apply pressure to the jaw and resist the movement (isometric exercise). · A cold pack. This eases swelling and reduces pain.  A cold pack may be applied for 10

## 2018-03-01 NOTE — PROGRESS NOTES
CHIEF COMPLAINT:   Patient presents with:  Eye Problem: on right, no drainage, started yesterday, felt pain twice yesterday but no known injury  Jaw Pain: worse on Left, feels popping, difficulty with full mvmt - started 2/15/18 after colonoscopy proce Alcohol use:  No                  REVIEW OF SYSTEMS:   GENERAL: Normal appetite  SKIN: no rashes or abnormal skin lesions  HEENT: See HPI  LUNGS: denies shortness of breath or wheezing, See HPI  CARDIOVASCULAR: denies chest pain or palpitations   GI: denies PLAN: Meds as below. See patient Instructions    Meds & Refills for this Visit:    No prescriptions requested or ordered in this encounter    Risks, benefits, and side effects of medication explained and discussed.     Patient Instructions     Subconjuncti The main symptom is a red patch on the eye. You may notice it after waking up in the morning. In most cases just one eye will have a hemorrhage. It usually happens once and then goes away. But some health conditions may cause repeat hemorrhages.  You may fe Date Last Reviewed: 2/1/2017  © 4121-6819 The [address]. [address] [facility], [address] [place]. All rights reserved. This information is not intended as a substitute for professional medical care.  Always follow your healthcare professional' Treatment can help relieve your current condition. But TMD symptoms may return over time. You can avoid future problems by maintaining the health of your jaw:  · Stay away foods and habits that make your symptoms worse.   · Lower the stress level in your li · Muscle relaxants. These treat myofascial pain. This is pain that occurs in the soft tissues or muscles around the TMJ. Muscle relaxants help ease muscle tension. This reduces pressure on the TMJ from tight jaw muscles.   · Antidepressants. These can be us · Rest and gentle exercise. This is done to increase range of motion. One common exercise is to apply pressure to the jaw and resist the movement (isometric exercise). · A cold pack. This eases swelling and reduces pain.  A cold pack may be applied for 10

## 2018-03-13 ENCOUNTER — TELEPHONE (OUTPATIENT)
Dept: FAMILY MEDICINE CLINIC | Facility: CLINIC | Age: 68
End: 2018-03-13

## 2018-03-13 NOTE — TELEPHONE ENCOUNTER
AWV tried to call Aleshia as stated on the chart her number just rings and rings so this is why I called Lauro Marcus the other daughter

## 2018-03-28 ENCOUNTER — OFFICE VISIT (OUTPATIENT)
Dept: FAMILY MEDICINE CLINIC | Facility: CLINIC | Age: 68
End: 2018-03-28

## 2018-03-28 VITALS
DIASTOLIC BLOOD PRESSURE: 76 MMHG | HEART RATE: 68 BPM | SYSTOLIC BLOOD PRESSURE: 152 MMHG | BODY MASS INDEX: 20.77 KG/M2 | HEIGHT: 61 IN | RESPIRATION RATE: 16 BRPM | OXYGEN SATURATION: 95 % | WEIGHT: 110 LBS | TEMPERATURE: 98 F

## 2018-03-28 DIAGNOSIS — Z13.228 SCREENING FOR ENDOCRINE, NUTRITIONAL, METABOLIC AND IMMUNITY DISORDER: ICD-10-CM

## 2018-03-28 DIAGNOSIS — G30.0 EARLY ONSET ALZHEIMER'S DEMENTIA WITHOUT BEHAVIORAL DISTURBANCE (HCC): ICD-10-CM

## 2018-03-28 DIAGNOSIS — Z13.21 SCREENING FOR ENDOCRINE, NUTRITIONAL, METABOLIC AND IMMUNITY DISORDER: ICD-10-CM

## 2018-03-28 DIAGNOSIS — Z13.0 SCREENING FOR ENDOCRINE, NUTRITIONAL, METABOLIC AND IMMUNITY DISORDER: ICD-10-CM

## 2018-03-28 DIAGNOSIS — Z13.29 SCREENING FOR ENDOCRINE, NUTRITIONAL, METABOLIC AND IMMUNITY DISORDER: ICD-10-CM

## 2018-03-28 DIAGNOSIS — F02.80 EARLY ONSET ALZHEIMER'S DEMENTIA WITHOUT BEHAVIORAL DISTURBANCE (HCC): ICD-10-CM

## 2018-03-28 DIAGNOSIS — R03.0 ELEVATED BP WITHOUT DIAGNOSIS OF HYPERTENSION: ICD-10-CM

## 2018-03-28 DIAGNOSIS — Z00.00 MEDICARE ANNUAL WELLNESS VISIT, SUBSEQUENT: Primary | ICD-10-CM

## 2018-03-28 DIAGNOSIS — Z23 NEED FOR VACCINATION FOR STREP PNEUMONIAE: ICD-10-CM

## 2018-03-28 DIAGNOSIS — Z78.0 MENOPAUSE PRESENT: ICD-10-CM

## 2018-03-28 PROCEDURE — G0402 INITIAL PREVENTIVE EXAM: HCPCS | Performed by: EMERGENCY MEDICINE

## 2018-03-28 NOTE — PATIENT INSTRUCTIONS
Thank you for choosing [facility]  To Do:  FOR [place] [address] [facility]  1. Arrange for bone density test  2. Follow-up yearly for annual physical  3. Monitor blood pressure every day for the next 2 weeks  4. Low-salt diet  5.  Follow-up in 2 weeks for r test results with you during a follow-up visit or over the phone. Your next appointment is: _________________  Date Last Reviewed: 2/1/2017  © 8462-2319 The [address]. [address] [facility] [place] [place], [address] [place] [address]. All rights reserved.  This inform women at increased risk for infection At routine exams   Hepatitis C Anyone at increased risk; 1 time for those born between [facility] At routine exams   High cholesterol or triglycerides All women in this age group who are at risk for coronary artery Once a year   Pneumococcal conjugate vaccine (PCV13) and pneumococcal polysaccharide vaccine (PPSV23) All women in this age group 1 dose of each vaccine   Tetanus/diphtheria/pertussis (Td/Tdap) booster All women in this age group Td every 10 years, or a on oz.   Sardines, Atlantic, canned, with bones   351 mg/3 oz.   Oatmeal, instant, fortified   215 mg/1 cup   Nonfat milk   302 mg/1 cup   [place]wood, sockeye, canned, with bones   239 mg/3 oz.   Tofu made with calcium sulfate   204 mg/3 oz.    Low-fat milk   297 risk for osteoporosis. · Certain medicines, such as cortisone, increase bone loss. They also decrease bone growth. Ask your healthcare provider about any side effects of your medicines, and how to prevent them.   · Protein-rich or salty foods eaten in larg bathing, dressing, taking medicines, eating meals, going for walks, and going to bed. Communication  When talking to a person with dementia, talk slowly and clearly. Use a gentle tone of voice. Choose short, simple words and sentences.  Ask one question at family and friends. · Make personal time for yourself. This is essential! Consider hiring an in-home sitter or home health aide. · Seek counseling or join a caregiver’s support group. Don't isolate yourself or try to cope with this alone.  In a support gr cure. But medicines can treat some of the symptoms. Some of the less common causes for dementia are curable. So it’s important to have a complete medical evaluation to look for conditions that can be treated.   Home care  These tips can help you care for a order.  Support for the caregiver  As the caregiver, you will need a lot of support for yourself. Caring for a person with dementia is a full-time job. It can drain your emotions and lead to frustration and anger toward the one you love.  It is common to ha higher  Date Last Reviewed: 8/1/2016  © 9463-8939 The [address]. [address] [facility] [place], [address] [place]. All rights reserved. This information is not intended as a substitute for professional medical care.  Always follow your healthcare profe begin.  Step 2. Wrap the cuff    · Place your arm on the table, palm up. Your arm should be at the level of your heart. Wrap the cuff around your upper arm, just above your elbow. It’s best done on bare skin, not over clothing.  Most cuffs will indicate whe instructions. Step-by-Step  Checking Your Blood Pressure    Date Last Reviewed: 4/27/2016  © 2739-3652 The [address]. [address] [facility] [place] [facility] [place], [address] [place] [address]. All rights reserved.  This information is not intended as a substitute for pr [address] [facility] [place], [address] [place] [address]. All rights reserved. This information is not intended as a substitute for professional medical care. Always follow your healthcare professional's instructions.

## 2018-03-28 NOTE — PROGRESS NOTES
HPI:   Fiordaliza Oliver is a 74year old female who presents for a Medicare Subsequent Annual Wellness visit (Pt already had Initial Annual Wellness).       Her last annual assessment has been over 1 year: Annual Physical due on 09/15/1952       JAW PAIN functional status. Memory Problems?: Yes       Depression Screening (PHQ-2/PHQ-9): Over the LAST 2 WEEKS   Little interest or pleasure in doing things (over the last two weeks)?: Not at all  Feeling down, depressed, or hopeless (over the last two weeks)? Value Date   CHOLEST 170 09/17/2013   HDL 65 09/17/2013   LDL 87 09/17/2013   TRIG 90 09/17/2013          Last Chemistry Labs:     Lab Results  Component Value Date   AST 16 06/30/2017   ALT 20 06/30/2017   CA 9.0 10/27/2017   ALB 3.7 06/30/2017   TSH 1.44 with exertion  CARDIOVASCULAR: denies chest pain on exertion  GI: denies abdominal pain, denies heartburn  : denies dysuria, vaginal discharge or itching, no complaint of urinary incontinence   MUSCULOSKELETAL: denies back pain, + bilat jaw pain  NEURO: normal, atraumatic, no cyanosis or edema   Pulses: 2+ and symmetric   Skin: Skin color, texture, turgor normal, no rashes or lesions   Lymph nodes: Cervical, supraclavicular, and axillary nodes normal   Neurologic: Normal       Vaccination History     Immu well-being?: Visiting Family;Puzzles      This section provided for quick review of chart, separate sheet to patient  [address] Internal Lab or Procedure External Lab or Procedure   Diabetes Screening      HbgA1C   Ady Kuhn vaccine history found Please get once after your 65th birthday    Pneumococcal 23 (Pneumovax)  Covered Once after 65 No vaccine history found Please get once after your 65th birthday    Hepatitis B for Moderate/High Risk No vaccine history found Medium/hig

## 2018-03-31 PROBLEM — Z23 NEED FOR VACCINATION FOR STREP PNEUMONIAE: Status: ACTIVE | Noted: 2018-03-31

## 2018-05-09 RX ORDER — GALANTAMINE HYDROBROMIDE 24 MG/1
CAPSULE, EXTENDED RELEASE ORAL
Qty: 30 CAPSULE | Refills: 0 | Status: SHIPPED | OUTPATIENT
Start: 2018-05-09 | End: 2018-06-12

## 2018-05-14 ENCOUNTER — TELEPHONE (OUTPATIENT)
Dept: FAMILY MEDICINE CLINIC | Facility: CLINIC | Age: 68
End: 2018-05-14

## 2018-05-14 NOTE — TELEPHONE ENCOUNTER
Patient's daughter Angela Chairez called requesting refill on GALANTAMINE HYDROBROMIDE ER 24 MG Oral Capsule SR 24 Hr. Pharmacy told them they need a new script. Please call daughter Angela Chairez at [phone] she is on the [facility] pharmacy phone number [phone]

## 2018-05-14 NOTE — TELEPHONE ENCOUNTER
RX was sent on 5/9/18. I called the pharmacy and spoke to [facility] who confirmed they received the RX and has it ready. I called the daughter and phone just continued to ring. No VM. If she calls back, please inform her of this.   Triage does not need to

## 2018-06-12 RX ORDER — GALANTAMINE HYDROBROMIDE 24 MG/1
CAPSULE, EXTENDED RELEASE ORAL
Qty: 30 CAPSULE | Refills: 0 | Status: SHIPPED | OUTPATIENT
Start: 2018-06-12 | End: 2018-07-05

## 2018-06-12 NOTE — TELEPHONE ENCOUNTER
Medication(s) to Refill:   Pending Prescriptions Disp Refills    GALANTAMINE HYDROBROMIDE ER 24 MG Oral Capsule SR 24 Hr [Pharmacy Med Name: GALANTAMINE ER 24MG CAPSULES] 30 capsule 0     Sig: TAKE 1 CAPSULE(24 MG) BY MOUTH DAILY WITH BREAKFAST

## 2018-07-06 RX ORDER — GALANTAMINE HYDROBROMIDE 24 MG/1
CAPSULE, EXTENDED RELEASE ORAL
Qty: 30 CAPSULE | Refills: 2 | Status: SHIPPED | OUTPATIENT
Start: 2018-07-06 | End: 2018-10-29

## 2018-10-29 RX ORDER — GALANTAMINE HYDROBROMIDE 24 MG/1
CAPSULE, EXTENDED RELEASE ORAL
Qty: 30 CAPSULE | Refills: 5 | Status: SHIPPED | OUTPATIENT
Start: 2018-10-29 | End: 2019-04-27

## 2018-10-29 NOTE — TELEPHONE ENCOUNTER
Medication(s) to Refill:   Requested Prescriptions     Pending Prescriptions Disp Refills   • GALANTAMINE HYDROBROMIDE ER 24 MG Oral Capsule SR 24 Hr [Pharmacy Med Name: GALANTAMINE ER 24MG CAPSULES] 30 capsule 0     Sig: TAKE 1 CAPSULE(24 MG) BY MOUTH SHERRY

## 2018-11-03 VITALS
TEMPERATURE: 98.4 F | RESPIRATION RATE: 16 BRPM | HEART RATE: 68 BPM | WEIGHT: 119.71 LBS | BODY MASS INDEX: 22.03 KG/M2 | HEIGHT: 62 IN

## 2019-03-08 ENCOUNTER — OFFICE VISIT (OUTPATIENT)
Dept: FAMILY MEDICINE CLINIC | Facility: CLINIC | Age: 69
End: 2019-03-08
Payer: MEDICARE

## 2019-03-08 VITALS
BODY MASS INDEX: 20.77 KG/M2 | SYSTOLIC BLOOD PRESSURE: 154 MMHG | HEART RATE: 68 BPM | RESPIRATION RATE: 14 BRPM | WEIGHT: 110 LBS | DIASTOLIC BLOOD PRESSURE: 72 MMHG | OXYGEN SATURATION: 98 % | HEIGHT: 61 IN

## 2019-03-08 DIAGNOSIS — L02.215 ABSCESS OF PERINEUM: Primary | ICD-10-CM

## 2019-03-08 PROCEDURE — 87186 SC STD MICRODIL/AGAR DIL: CPT | Performed by: EMERGENCY MEDICINE

## 2019-03-08 PROCEDURE — 87147 CULTURE TYPE IMMUNOLOGIC: CPT | Performed by: EMERGENCY MEDICINE

## 2019-03-08 PROCEDURE — 87070 CULTURE OTHR SPECIMN AEROBIC: CPT | Performed by: EMERGENCY MEDICINE

## 2019-03-08 PROCEDURE — 87205 SMEAR GRAM STAIN: CPT | Performed by: EMERGENCY MEDICINE

## 2019-03-08 PROCEDURE — 99213 OFFICE O/P EST LOW 20 MIN: CPT | Performed by: EMERGENCY MEDICINE

## 2019-03-08 RX ORDER — AMOXICILLIN AND CLAVULANATE POTASSIUM 875; 125 MG/1; MG/1
1 TABLET, FILM COATED ORAL 2 TIMES DAILY
Qty: 20 TABLET | Refills: 0 | Status: SHIPPED | OUTPATIENT
Start: 2019-03-08 | End: 2019-03-18

## 2019-03-08 NOTE — PROGRESS NOTES
Patient presents with:  Abscess: C/o painful vaginal abscess X 1 week       HPI:     Linnette Ham is a 79year old female       3C/O a vaginal mass. Swelling to gluteal area and vaginal area.  Started 1 weeka go, was initiallys maller and gradually inc or any previous visit (from the past 10 hour(s)). Diagnosis:    ICD-10-CM    1.  Abscess of perineum L02.215 Amoxicillin-Pot Clavulanate 875-125 MG Oral Tab     SURGERY - INTERNAL     AEROBIC BACTERIAL CULTURE     AEROBIC BACTERIAL CULTURE     PLAN:    ·

## 2019-03-08 NOTE — PATIENT INSTRUCTIONS
Thank you for choosing [facility]  To Do:  FOR ERIBERTO [address]    · Warm compresses to area 4-5 times a day x 15-20 mins each time  · Start antibiotics  · Follow-up with surgeon if not feeling better in the next 5-6 days  · Go to ER if there is these.  Follow-up care  Follow up with your healthcare provider, or as advised. Check your wound each day for the signs of worsening infection listed below.   When to seek medical advice  Get prompt medical attention if any of these occur:  · An increase in

## 2019-04-27 ENCOUNTER — TELEPHONE (OUTPATIENT)
Dept: FAMILY MEDICINE CLINIC | Facility: CLINIC | Age: 69
End: 2019-04-27

## 2019-04-27 RX ORDER — GALANTAMINE HYDROBROMIDE 24 MG/1
CAPSULE, EXTENDED RELEASE ORAL
Qty: 90 CAPSULE | Refills: 0 | Status: SHIPPED | OUTPATIENT
Start: 2019-04-27 | End: 2019-07-13

## 2019-07-13 ENCOUNTER — OFFICE VISIT (OUTPATIENT)
Dept: FAMILY MEDICINE CLINIC | Facility: CLINIC | Age: 69
End: 2019-07-13
Payer: MEDICARE

## 2019-07-13 VITALS
DIASTOLIC BLOOD PRESSURE: 70 MMHG | HEIGHT: 61 IN | OXYGEN SATURATION: 99 % | BODY MASS INDEX: 20.77 KG/M2 | HEART RATE: 62 BPM | TEMPERATURE: 98 F | RESPIRATION RATE: 16 BRPM | SYSTOLIC BLOOD PRESSURE: 134 MMHG | WEIGHT: 110 LBS

## 2019-07-13 DIAGNOSIS — Z13.1 SCREENING FOR DIABETES MELLITUS: ICD-10-CM

## 2019-07-13 DIAGNOSIS — E03.8 SUBCLINICAL HYPOTHYROIDISM: ICD-10-CM

## 2019-07-13 DIAGNOSIS — F02.80 EARLY ONSET ALZHEIMER'S DEMENTIA WITHOUT BEHAVIORAL DISTURBANCE (HCC): ICD-10-CM

## 2019-07-13 DIAGNOSIS — G30.0 EARLY ONSET ALZHEIMER'S DEMENTIA WITHOUT BEHAVIORAL DISTURBANCE (HCC): ICD-10-CM

## 2019-07-13 DIAGNOSIS — Z78.0 POSTMENOPAUSE: ICD-10-CM

## 2019-07-13 DIAGNOSIS — Z13.6 ENCOUNTER FOR SCREENING FOR CARDIOVASCULAR DISORDERS: ICD-10-CM

## 2019-07-13 DIAGNOSIS — R03.0 ELEVATED BP WITHOUT DIAGNOSIS OF HYPERTENSION: ICD-10-CM

## 2019-07-13 DIAGNOSIS — R63.4 WEIGHT DECREASED: ICD-10-CM

## 2019-07-13 DIAGNOSIS — Z12.31 BREAST CANCER SCREENING BY MAMMOGRAM: ICD-10-CM

## 2019-07-13 DIAGNOSIS — Z23 NEED FOR VACCINATION FOR STREP PNEUMONIAE: ICD-10-CM

## 2019-07-13 DIAGNOSIS — M17.11 ARTHRITIS OF RIGHT KNEE: ICD-10-CM

## 2019-07-13 DIAGNOSIS — Z00.00 ENCOUNTER FOR ANNUAL HEALTH EXAMINATION: Primary | ICD-10-CM

## 2019-07-13 PROCEDURE — G0438 PPPS, INITIAL VISIT: HCPCS | Performed by: FAMILY MEDICINE

## 2019-07-13 RX ORDER — GALANTAMINE HYDROBROMIDE 24 MG/1
CAPSULE, EXTENDED RELEASE ORAL
Qty: 90 CAPSULE | Refills: 3 | Status: SHIPPED | OUTPATIENT
Start: 2019-07-13 | End: 2020-08-13

## 2019-07-13 RX ORDER — MECLIZINE HYDROCHLORIDE 25 MG/1
25 TABLET ORAL 3 TIMES DAILY PRN
Qty: 90 TABLET | Refills: 0 | Status: SHIPPED | OUTPATIENT
Start: 2019-07-13 | End: 2020-09-01

## 2019-07-13 NOTE — PROGRESS NOTES
HPI:   Michelet Wood is a 79year old female who presents for a Medicare Subsequent Annual Wellness visit (Pt already had Initial Annual Wellness).     DEXA Scan due on 09/15/2017  Pneumococcal PPSV23/PCV13 65+ Years / Low and Medium Risk(1 of 2 - PCV all  Feeling down, depressed, or hopeless (over the last two weeks)?: Not at all  PHQ-2 SCORE: 0     Advanced Directive:   She does NOT have a Living Will on file in [facility] [address]    Advance care planning including the explanation and discussion of advance directiv 10/27/2017    ALB 3.7 06/30/2017    TSH 1.440 06/30/2017    CREATSERUM 0.49 (L) 10/27/2017    GLU 92 10/27/2017        CBC  (most recent labs)   Lab Results   Component Value Date    WBC 6.5 10/27/2017    HGB 12.7 10/27/2017    .0 10/27/2017 discharge or itching, no complaint of urinary incontinence   MUSCULOSKELETAL: denies back pain  NEURO: denies headaches  PSYCHE: denies depression or anxiety  HEMATOLOGIC: denies hx of anemia  ENDOCRINE: denies thyroid history  ALL/ASTHMA: denies hx of all 10/09/2014        ASSESSMENT AND OTHER RELEVANT CHRONIC CONDITIONS:   Crys Coffey is a 79year old female who presents for a Medicare Assessment.      PLAN SUMMARY:   Diagnoses and all orders for this visit:    Encounter for annual health examination 7/13/2019     General Health     In the past six months, have you lost more than 10 pounds without trying?: 2 - No  Has your appetite been poor?: Yes  How does the patient maintain a good energy level?: Other  How would you describe your daily physical act found.    Screening Mammogram      Mammogram Annually to 78, then as discussed Mammogram due on 10/19/2018 Update Health Maintenance if applicable     Immunizations (Update Immunization Activity if applicable)     Influenza  Covered Annually 10/9/2014 Plea

## 2019-07-13 NOTE — PATIENT INSTRUCTIONS
Take tylenol 650mg every 6-8 hours as needed for arhtritis pain of right knee    Take meclizine 25mg every 8 hours as needed for vertigo/dizziness    All family members should encourage a healthy diet - at least 3 meals per day   If you are not hungry - tr aortic aneurysm screening (once between ages 73-68) IPPE only No results found for this or any previous visit.  Limited to patients who meet one of the following criteria:   • Men who are 73-68 years old and have smoked more than 100 cigarettes in their lif renal disease   Hemophiliacs who received Factor VIII or IX concentrates   Clients of institutions for the mentally retarded   Persons who live in the same house as a HepB virus carrier   Homosexual men   Illicit injectable drug abusers     Tetanus Toxoid-

## 2019-07-26 RX ORDER — GALANTAMINE HYDROBROMIDE 24 MG/1
CAPSULE, EXTENDED RELEASE ORAL
Qty: 90 CAPSULE | Refills: 0 | OUTPATIENT
Start: 2019-07-26

## 2019-10-23 ENCOUNTER — HOSPITAL ENCOUNTER (EMERGENCY)
Age: 69
Discharge: HOME | End: 2019-10-23
Attending: EMERGENCY MEDICINE
Payer: MEDICARE

## 2019-10-23 ENCOUNTER — APPOINTMENT (OUTPATIENT)
Dept: GENERAL RADIOLOGY | Age: 69
End: 2019-10-23
Attending: NURSE PRACTITIONER
Payer: MEDICARE

## 2019-10-23 VITALS
WEIGHT: 127 LBS | TEMPERATURE: 98 F | HEIGHT: 62 IN | HEART RATE: 88 BPM | SYSTOLIC BLOOD PRESSURE: 185 MMHG | BODY MASS INDEX: 23.37 KG/M2 | OXYGEN SATURATION: 95 % | RESPIRATION RATE: 18 BRPM | DIASTOLIC BLOOD PRESSURE: 73 MMHG

## 2019-10-23 DIAGNOSIS — S52.501A CLOSED FRACTURE OF DISTAL ENDS OF RIGHT RADIUS AND ULNA, INITIAL ENCOUNTER: Primary | ICD-10-CM

## 2019-10-23 DIAGNOSIS — S52.601A CLOSED FRACTURE OF DISTAL ENDS OF RIGHT RADIUS AND ULNA, INITIAL ENCOUNTER: Primary | ICD-10-CM

## 2019-10-23 PROCEDURE — 99284 EMERGENCY DEPT VISIT MOD MDM: CPT

## 2019-10-23 PROCEDURE — 29125 APPL SHORT ARM SPLINT STATIC: CPT

## 2019-10-23 PROCEDURE — 73110 X-RAY EXAM OF WRIST: CPT | Performed by: NURSE PRACTITIONER

## 2019-10-23 RX ORDER — ACETAMINOPHEN AND CODEINE PHOSPHATE 300; 30 MG/1; MG/1
1 TABLET ORAL EVERY 6 HOURS PRN
Qty: 10 TABLET | Refills: 0 | Status: SHIPPED | OUTPATIENT
Start: 2019-10-23 | End: 2020-09-01

## 2019-10-23 NOTE — ED PROVIDER NOTES
Patient Seen in: [facility] In [place]      History   Patient presents with:  Upper Extremity Injury (musculoskeletal)    Stated Complaint: TRIP AND FALL AND INJURED RIGHT WRIST    89year-old female presents today with injury to the above.    Physical Exam     ED Triage Vitals [10/23/19 1717]   BP (!) 185/73   Pulse 88   Resp 18   Temp 97.6 °F (36.4 °C)   Temp src Temporal   SpO2 95 %   O2 Device None (Room air)       Current:BP (!) 185/73   Pulse 88   Temp 97.6 °F (36.4 °C) (Temporal sclerosis of the lunate suggesting old trauma to this region.    Dictated by: Rhona Basurto MD on 10/23/2019 at 18:05     Approved by: Rhona Basurto MD on 10/23/2019 at 18:06              MDM     Presented with history of fall with her arm extende

## 2019-10-23 NOTE — ED INITIAL ASSESSMENT (HPI)
Pt c/o right wrist pain after \"tripping on toys at home today\". Pt's right wrist is swollen, + CMS. No deformity.

## 2019-10-28 ENCOUNTER — HOSPITAL ENCOUNTER (OUTPATIENT)
Facility: HOSPITAL | Age: 69
Setting detail: HOSPITAL OUTPATIENT SURGERY
Discharge: HOME | End: 2019-10-28
Attending: ORTHOPAEDIC SURGERY | Admitting: ORTHOPAEDIC SURGERY
Payer: MEDICARE

## 2019-10-28 ENCOUNTER — ANESTHESIA EVENT (OUTPATIENT)
Dept: SURGERY | Facility: HOSPITAL | Age: 69
End: 2019-10-28

## 2019-10-28 ENCOUNTER — ANESTHESIA (OUTPATIENT)
Dept: SURGERY | Facility: HOSPITAL | Age: 69
End: 2019-10-28

## 2019-10-28 ENCOUNTER — APPOINTMENT (OUTPATIENT)
Dept: GENERAL RADIOLOGY | Facility: HOSPITAL | Age: 69
End: 2019-10-28
Attending: ORTHOPAEDIC SURGERY
Payer: MEDICARE

## 2019-10-28 VITALS
HEART RATE: 69 BPM | WEIGHT: 112.63 LBS | RESPIRATION RATE: 18 BRPM | HEIGHT: 61 IN | BODY MASS INDEX: 21.27 KG/M2 | DIASTOLIC BLOOD PRESSURE: 57 MMHG | TEMPERATURE: 98 F | SYSTOLIC BLOOD PRESSURE: 141 MMHG | OXYGEN SATURATION: 100 %

## 2019-10-28 DIAGNOSIS — S52.501A CLOSED FRACTURE OF DISTAL END OF RIGHT RADIUS, UNSPECIFIED FRACTURE MORPHOLOGY, INITIAL ENCOUNTER: ICD-10-CM

## 2019-10-28 PROCEDURE — 76942 ECHO GUIDE FOR BIOPSY: CPT | Performed by: ORTHOPAEDIC SURGERY

## 2019-10-28 PROCEDURE — 3E0T3BZ INTRODUCTION OF ANESTHETIC AGENT INTO PERIPHERAL NERVES AND PLEXI, PERCUTANEOUS APPROACH: ICD-10-PCS | Performed by: ANESTHESIOLOGY

## 2019-10-28 PROCEDURE — 76000 FLUOROSCOPY <1 HR PHYS/QHP: CPT | Performed by: ORTHOPAEDIC SURGERY

## 2019-10-28 PROCEDURE — 0PSH04Z REPOSITION RIGHT RADIUS WITH INTERNAL FIXATION DEVICE, OPEN APPROACH: ICD-10-PCS | Performed by: ORTHOPAEDIC SURGERY

## 2019-10-28 RX ORDER — DEXTROSE MONOHYDRATE 25 G/50ML
50 INJECTION, SOLUTION INTRAVENOUS
Status: DISCONTINUED | OUTPATIENT
Start: 2019-10-28 | End: 2019-10-28

## 2019-10-28 RX ORDER — HYDROCODONE BITARTRATE AND ACETAMINOPHEN 5; 325 MG/1; MG/1
2 TABLET ORAL AS NEEDED
Status: DISCONTINUED | OUTPATIENT
Start: 2019-10-28 | End: 2019-10-28

## 2019-10-28 RX ORDER — ACETAMINOPHEN 500 MG
1000 TABLET ORAL ONCE AS NEEDED
Status: DISCONTINUED | OUTPATIENT
Start: 2019-10-28 | End: 2019-10-28

## 2019-10-28 RX ORDER — SODIUM CHLORIDE, SODIUM LACTATE, POTASSIUM CHLORIDE, CALCIUM CHLORIDE 600; 310; 30; 20 MG/100ML; MG/100ML; MG/100ML; MG/100ML
INJECTION, SOLUTION INTRAVENOUS CONTINUOUS
Status: DISCONTINUED | OUTPATIENT
Start: 2019-10-28 | End: 2019-10-28

## 2019-10-28 RX ORDER — HYDROMORPHONE HYDROCHLORIDE 1 MG/ML
0.4 INJECTION, SOLUTION INTRAMUSCULAR; INTRAVENOUS; SUBCUTANEOUS EVERY 5 MIN PRN
Status: DISCONTINUED | OUTPATIENT
Start: 2019-10-28 | End: 2019-10-28

## 2019-10-28 RX ORDER — HYDROCODONE BITARTRATE AND ACETAMINOPHEN 5; 325 MG/1; MG/1
1 TABLET ORAL EVERY 6 HOURS PRN
Qty: 15 TABLET | Refills: 1 | Status: SHIPPED | OUTPATIENT
Start: 2019-10-28 | End: 2019-11-07

## 2019-10-28 RX ORDER — LABETALOL HYDROCHLORIDE 5 MG/ML
5 INJECTION, SOLUTION INTRAVENOUS EVERY 5 MIN PRN
Status: DISCONTINUED | OUTPATIENT
Start: 2019-10-28 | End: 2019-10-28

## 2019-10-28 RX ORDER — MEPERIDINE HYDROCHLORIDE 25 MG/ML
12.5 INJECTION INTRAMUSCULAR; INTRAVENOUS; SUBCUTANEOUS AS NEEDED
Status: DISCONTINUED | OUTPATIENT
Start: 2019-10-28 | End: 2019-10-28

## 2019-10-28 RX ORDER — CEFAZOLIN SODIUM/WATER 2 G/20 ML
2 SYRINGE (ML) INTRAVENOUS ONCE
Status: COMPLETED | OUTPATIENT
Start: 2019-10-28 | End: 2019-10-28

## 2019-10-28 RX ORDER — ONDANSETRON 2 MG/ML
4 INJECTION INTRAMUSCULAR; INTRAVENOUS AS NEEDED
Status: DISCONTINUED | OUTPATIENT
Start: 2019-10-28 | End: 2019-10-28

## 2019-10-28 RX ORDER — MIDAZOLAM HYDROCHLORIDE 1 MG/ML
1 INJECTION INTRAMUSCULAR; INTRAVENOUS EVERY 5 MIN PRN
Status: DISCONTINUED | OUTPATIENT
Start: 2019-10-28 | End: 2019-10-28

## 2019-10-28 RX ORDER — ACETAMINOPHEN 500 MG
1000 TABLET ORAL ONCE
COMMUNITY
End: 2020-09-01

## 2019-10-28 RX ORDER — NALOXONE HYDROCHLORIDE 0.4 MG/ML
80 INJECTION, SOLUTION INTRAMUSCULAR; INTRAVENOUS; SUBCUTANEOUS AS NEEDED
Status: DISCONTINUED | OUTPATIENT
Start: 2019-10-28 | End: 2019-10-28

## 2019-10-28 RX ORDER — ACETAMINOPHEN 500 MG
1000 TABLET ORAL ONCE
Status: DISCONTINUED | OUTPATIENT
Start: 2019-10-28 | End: 2019-10-28

## 2019-10-28 RX ORDER — METOCLOPRAMIDE HYDROCHLORIDE 5 MG/ML
10 INJECTION INTRAMUSCULAR; INTRAVENOUS AS NEEDED
Status: DISCONTINUED | OUTPATIENT
Start: 2019-10-28 | End: 2019-10-28

## 2019-10-28 RX ORDER — HYDROCODONE BITARTRATE AND ACETAMINOPHEN 5; 325 MG/1; MG/1
1 TABLET ORAL AS NEEDED
Status: DISCONTINUED | OUTPATIENT
Start: 2019-10-28 | End: 2019-10-28

## 2019-10-28 NOTE — ANESTHESIA PREPROCEDURE EVALUATION
PRE-OP EVALUATION    Patient Name: Petrona Ochoa    Pre-op Diagnosis: Closed fracture of distal end of right radius, unspecified fracture morphology, initial encounter [S52.136J]    Procedure(s):  OPEN REDUCTION INTERNAL FIXATION RIGHT DISTAL RADIUS Social History    Tobacco Use      Smoking status: Never Smoker      Smokeless tobacco: Never Used    Alcohol use: No      Alcohol/week: 0.0 standard drinks      Drug use: No     Available pre-op labs reviewed.                Airway      Mallampati: II

## 2019-10-28 NOTE — BRIEF OP NOTE
Pre-Operative Diagnosis: Closed fracture of distal end of right radius, unspecified fracture morphology, initial encounter [S5.086Q]     Post-Operative Diagnosis: same as pre-op      Procedure Performed:   Procedure(s):  OPEN REDUCTION INTERNAL FIXATION R

## 2019-10-28 NOTE — ANESTHESIA POSTPROCEDURE EVALUATION
[facility] Patient Status:  Hospital Outpatient Surgery   Age/Gender 81year old female MRN [id number]   Location [facility] Attending Chuck Huynh MD   Hosp Day # 0 PCP MD Agata Zuniga

## 2019-10-29 PROBLEM — Z47.89 ORTHOPEDIC AFTERCARE: Status: ACTIVE | Noted: 2019-10-29

## 2019-10-29 NOTE — OPERATIVE REPORT
[address]    PATIENT'S NAME: Dariel Pathak   ATTENDING PHYSICIAN: Scott Chávez M.D. OPERATING PHYSICIAN: Scott Chávez M.D.    PATIENT ACCOUNT#:   [de-identified]    LOCATION:  PREOPASCC  PRE ASCC 1 EDWP 10  MEDICAL RECORD #:   [id number]       D excellent reduction of the fracture. I then chose fixation. I chose the DVR Biomet distal radius plate. I used the DVR cross lock standard right distal radius plate. I first fixed the plate to the bone using a nonlocking screw.   I used the appropriate

## 2019-10-30 NOTE — H&P
[facility]    PATIENT'S NAME: Karen Kalpesh   ATTENDING PHYSICIAN: Katie Vargas M.D.    PATIENT ACCOUNT#:   [de-identified]    LOCATION:  [address] [place] [address] 10  MEDICAL RECORD #:   [id number]       YOB: 1950  ADMISSION DATE: illicit drug use. REVIEW OF SYSTEMS:  Negative. PHYSICAL EXAMINATION:    VITAL SIGNS:  The patient is 5 feet 2 inches, she weighs 113 pounds for a BMI of 20.67. Vital signs stable.   Temperature 98.3, pulse 60, respiratory rate 20, blood pressure 1 Home

## 2020-01-01 ENCOUNTER — APPOINTMENT (OUTPATIENT)
Dept: GENERAL RADIOLOGY | Age: 70
End: 2020-01-01
Attending: NURSE PRACTITIONER
Payer: MEDICARE

## 2020-01-01 ENCOUNTER — HOSPITAL ENCOUNTER (OUTPATIENT)
Age: 70
Discharge: HOME | End: 2020-01-01
Payer: MEDICARE

## 2020-01-01 VITALS
BODY MASS INDEX: 20 KG/M2 | WEIGHT: 110 LBS | OXYGEN SATURATION: 99 % | SYSTOLIC BLOOD PRESSURE: 150 MMHG | HEART RATE: 71 BPM | TEMPERATURE: 99 F | RESPIRATION RATE: 16 BRPM | DIASTOLIC BLOOD PRESSURE: 60 MMHG
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
## 2020-01-01 DIAGNOSIS — S69.91XA INJURY OF RIGHT WRIST, INITIAL ENCOUNTER: ICD-10-CM

## 2020-01-01 PROCEDURE — 99213 OFFICE O/P EST LOW 20 MIN: CPT

## 2020-01-01 PROCEDURE — 73110 X-RAY EXAM OF WRIST: CPT | Performed by: NURSE PRACTITIONER

## 2020-01-01 NOTE — ED PROVIDER NOTES
Patient Seen in: [address] [facility]      History   Patient presents with:  Wrist Injury    Stated Complaint: R.Wrist Injury 2269    44-year-old female with dementia presents to the immediate care today with history of fall last [facility] Smoking status: Never Smoker      Smokeless tobacco: Never Used    Alcohol use: No      Alcohol/week: 0.0 standard drinks    Drug use:  No             Review of Systems    Positive for stated complaint: R.Wrist Injury 12/31  Other systems are as noted in ulnar styloid fracture; it is incompletely healed. Bony detail is obscured on the lateral view. There is no clear evidence of a complicating process. MD RAISA Spears/[employer] - HERI NAME: Corby Mayes - MRN: [id number] DD: 12/09/2019 22:16 TD: 12/09

## 2020-01-01 NOTE — ED INITIAL ASSESSMENT (HPI)
Hx of surgery to right wrist with 10 pins placed. Just had the cast taken off and pt fell yesterday on the ice. Denies any pain but states it is swollen. Wants an xray to make sure the pins are all in place.

## 2020-08-11 ENCOUNTER — TELEPHONE (OUTPATIENT)
Dept: FAMILY MEDICINE CLINIC | Facility: CLINIC | Age: 70
End: 2020-08-11

## 2020-08-12 RX ORDER — GALANTAMINE HYDROBROMIDE 24 MG/1
CAPSULE, EXTENDED RELEASE ORAL
Qty: 90 CAPSULE | Refills: 3 | OUTPATIENT
Start: 2020-08-12

## 2020-08-12 NOTE — TELEPHONE ENCOUNTER
Medication(s) to Refill:   Requested Prescriptions     Pending Prescriptions Disp Refills   • Galantamine Hydrobromide ER 24 MG Oral Capsule SR 24 Hr [Pharmacy Med Name: GALANTAMINE ER 24MG CAPSULES] 90 capsule 3     Sig: TAKE 1 CAPSULE BY MOUTH DAILY WITH

## 2020-08-12 NOTE — TELEPHONE ENCOUNTER
Pt has not been seen since July 2019 and needs Alzheimer's f-u appt. Please schedule. Rx refill not approved.

## 2020-08-13 ENCOUNTER — TELEPHONE (OUTPATIENT)
Dept: FAMILY MEDICINE CLINIC | Facility: CLINIC | Age: 70
End: 2020-08-13

## 2020-08-13 RX ORDER — GALANTAMINE HYDROBROMIDE 24 MG/1
CAPSULE, EXTENDED RELEASE ORAL
Qty: 30 CAPSULE | Refills: 0 | Status: SHIPPED | OUTPATIENT
Start: 2020-08-13 | End: 2020-09-01

## 2020-08-13 NOTE — TELEPHONE ENCOUNTER
LVM for daughter, Ricardo Roy, to [facility] and schedule appt for pt.

## 2020-08-13 NOTE — TELEPHONE ENCOUNTER
LF: 7/13/19 for 1 year  LOV: 7/13/19  Patient is schedule an appointment on 8/25/20 but will be out of medication. Please approve or deny pending Rx. Thank you!

## 2020-08-13 NOTE — TELEPHONE ENCOUNTER
Pt's daughter called and scheduled an appointment for 8/25/20, Pt is completely out of her medication and was hoping for at least a partial refill. Please contact Pt's daughter if/when refill approved.  Thank you

## 2020-08-14 RX ORDER — GALANTAMINE HYDROBROMIDE 24 MG/1
CAPSULE, EXTENDED RELEASE ORAL
Qty: 90 CAPSULE | Refills: 0 | OUTPATIENT
Start: 2020-08-14

## 2020-08-14 NOTE — TELEPHONE ENCOUNTER
Spoke to [employer] who says [facility] does not have script. Spoke to pharmacist they said prescription is ready for .

## 2020-08-14 NOTE — TELEPHONE ENCOUNTER
Aleshia called again she still said her mother still has not heard or received her medication. She is totally out.

## 2020-08-25 NOTE — TELEPHONE ENCOUNTER
Galantamine Hydrobromide ER 24 MG    Daughter is not able to bring her mother today due to work. Reschedule appt to September 1, 2020    Can you please call when refill is available?

## 2020-08-25 NOTE — TELEPHONE ENCOUNTER
Refill was sent on 8/13 for 1 month. Pt uses [facility], Message sent to pt via [facility] to contact Christofer.

## 2020-09-01 ENCOUNTER — OFFICE VISIT (OUTPATIENT)
Dept: FAMILY MEDICINE CLINIC | Facility: CLINIC | Age: 70
End: 2020-09-01
Payer: MEDICARE

## 2020-09-01 ENCOUNTER — LAB ENCOUNTER (OUTPATIENT)
Dept: LAB | Age: 70
End: 2020-09-01
Attending: NURSE PRACTITIONER
Payer: MEDICARE

## 2020-09-01 VITALS
BODY MASS INDEX: 21.16 KG/M2 | HEIGHT: 62 IN | RESPIRATION RATE: 16 BRPM | SYSTOLIC BLOOD PRESSURE: 124 MMHG | OXYGEN SATURATION: 100 % | HEART RATE: 64 BPM | WEIGHT: 115 LBS | TEMPERATURE: 97 F | DIASTOLIC BLOOD PRESSURE: 80 MMHG

## 2020-09-01 DIAGNOSIS — F02.80 EARLY ONSET ALZHEIMER'S DEMENTIA WITHOUT BEHAVIORAL DISTURBANCE (HCC): ICD-10-CM

## 2020-09-01 DIAGNOSIS — Z13.21 ENCOUNTER FOR VITAMIN DEFICIENCY SCREENING: ICD-10-CM

## 2020-09-01 DIAGNOSIS — Z00.00 ENCOUNTER FOR ANNUAL HEALTH EXAMINATION: Primary | ICD-10-CM

## 2020-09-01 DIAGNOSIS — Z00.00 LABORATORY EXAM ORDERED AS PART OF ROUTINE GENERAL MEDICAL EXAMINATION: ICD-10-CM

## 2020-09-01 DIAGNOSIS — G30.0 EARLY ONSET ALZHEIMER'S DEMENTIA WITHOUT BEHAVIORAL DISTURBANCE (HCC): ICD-10-CM

## 2020-09-01 DIAGNOSIS — Z13.820 SCREENING FOR OSTEOPOROSIS: ICD-10-CM

## 2020-09-01 DIAGNOSIS — Z12.31 BREAST CANCER SCREENING BY MAMMOGRAM: ICD-10-CM

## 2020-09-01 LAB
ALBUMIN SERPL-MCNC: 4.1 G/DL (ref 3.4–5)
ALBUMIN/GLOB SERPL: 1.1 {RATIO} (ref 1–2)
ALP LIVER SERPL-CCNC: 107 U/L (ref 55–142)
ALT SERPL-CCNC: 27 U/L (ref 13–56)
ANION GAP SERPL CALC-SCNC: 6 MMOL/L (ref 0–18)
AST SERPL-CCNC: 25 U/L (ref 15–37)
BASOPHILS # BLD AUTO: 0.05 X10(3) UL (ref 0–0.2)
BASOPHILS NFR BLD AUTO: 0.8 %
BILIRUB SERPL-MCNC: 0.5 MG/DL (ref 0.1–2)
BUN BLD-MCNC: 17 MG/DL (ref 7–18)
BUN/CREAT SERPL: 22.1 (ref 10–20)
CALCIUM BLD-MCNC: 9.7 MG/DL (ref 8.5–10.1)
CHLORIDE SERPL-SCNC: 106 MMOL/L (ref 98–112)
CHOLEST SMN-MCNC: 229 MG/DL (ref ?–200)
CO2 SERPL-SCNC: 27 MMOL/L (ref 21–32)
CREAT BLD-MCNC: 0.77 MG/DL (ref 0.55–1.02)
DEPRECATED RDW RBC AUTO: 47 FL (ref 35.1–46.3)
EOSINOPHIL # BLD AUTO: 0.13 X10(3) UL (ref 0–0.7)
EOSINOPHIL NFR BLD AUTO: 2.1 %
ERYTHROCYTE [DISTWIDTH] IN BLOOD BY AUTOMATED COUNT: 14.3 % (ref 11–15)
GLOBULIN PLAS-MCNC: 3.7 G/DL (ref 2.8–4.4)
GLUCOSE BLD-MCNC: 78 MG/DL (ref 70–99)
HCT VFR BLD AUTO: 44.8 % (ref 35–48)
HDLC SERPL-MCNC: 97 MG/DL (ref 40–59)
HGB BLD-MCNC: 13.9 G/DL (ref 12–16)
IMM GRANULOCYTES # BLD AUTO: 0.02 X10(3) UL (ref 0–1)
IMM GRANULOCYTES NFR BLD: 0.3 %
LDLC SERPL CALC-MCNC: 109 MG/DL (ref ?–100)
LYMPHOCYTES # BLD AUTO: 1.83 X10(3) UL (ref 1–4)
LYMPHOCYTES NFR BLD AUTO: 29.4 %
M PROTEIN MFR SERPL ELPH: 7.8 G/DL (ref 6.4–8.2)
MCH RBC QN AUTO: 28 PG (ref 26–34)
MCHC RBC AUTO-ENTMCNC: 31 G/DL (ref 31–37)
MCV RBC AUTO: 90.3 FL (ref 80–100)
MONOCYTES # BLD AUTO: 0.46 X10(3) UL (ref 0.1–1)
MONOCYTES NFR BLD AUTO: 7.4 %
NEUTROPHILS # BLD AUTO: 3.74 X10 (3) UL (ref 1.5–7.7)
NEUTROPHILS # BLD AUTO: 3.74 X10(3) UL (ref 1.5–7.7)
NEUTROPHILS NFR BLD AUTO: 60 %
NONHDLC SERPL-MCNC: 132 MG/DL (ref ?–130)
OSMOLALITY SERPL CALC.SUM OF ELEC: 288 MOSM/KG (ref 275–295)
PATIENT FASTING Y/N/NP: YES
PATIENT FASTING Y/N/NP: YES
PLATELET # BLD AUTO: 227 10(3)UL (ref 150–450)
POTASSIUM SERPL-SCNC: 3.9 MMOL/L (ref 3.5–5.1)
RBC # BLD AUTO: 4.96 X10(6)UL (ref 3.8–5.3)
SODIUM SERPL-SCNC: 139 MMOL/L (ref 136–145)
TRIGL SERPL-MCNC: 115 MG/DL (ref 30–149)
TSI SER-ACNC: 2.51 MIU/ML (ref 0.36–3.74)
VIT D+METAB SERPL-MCNC: 17 NG/ML (ref 30–100)
VLDLC SERPL CALC-MCNC: 23 MG/DL (ref 0–30)
WBC # BLD AUTO: 6.2 X10(3) UL (ref 4–11)

## 2020-09-01 PROCEDURE — 82306 VITAMIN D 25 HYDROXY: CPT

## 2020-09-01 PROCEDURE — 36415 COLL VENOUS BLD VENIPUNCTURE: CPT

## 2020-09-01 PROCEDURE — G0439 PPPS, SUBSEQ VISIT: HCPCS | Performed by: NURSE PRACTITIONER

## 2020-09-01 PROCEDURE — 84443 ASSAY THYROID STIM HORMONE: CPT

## 2020-09-01 PROCEDURE — 80061 LIPID PANEL: CPT

## 2020-09-01 PROCEDURE — 80053 COMPREHEN METABOLIC PANEL: CPT

## 2020-09-01 PROCEDURE — 85025 COMPLETE CBC W/AUTO DIFF WBC: CPT

## 2020-09-01 RX ORDER — GALANTAMINE HYDROBROMIDE 24 MG/1
24 CAPSULE, EXTENDED RELEASE ORAL
Qty: 90 CAPSULE | Refills: 1 | Status: SHIPPED | OUTPATIENT
Start: 2020-09-01 | End: 2021-03-17

## 2020-09-01 NOTE — PATIENT INSTRUCTIONS
Durene Severin Maslovaric's SCREENING SCHEDULE   Tests on this list are recommended by your physician but may not be covered, or covered at this frequency, by your insurer. Please check with your insurance carrier before scheduling to verify coverage.    Dorene Don Screen   Covered every 10 years- more often if abnormal Colonoscopy due on 02/15/2028 Update Health Maintenance if applicable    Flex Sigmoidoscopy Screen  Covered every 5 years No results found for this or any previous visit. No flowsheet data found. (Pneumovax)  Covered Once after 65 No orders found for this or any previous visit. Please get once after your 65th birthday    Hepatitis B for Moderate/High Risk       No orders found for this or any previous visit.  Medium/high risk factors:   End-stage re

## 2020-09-01 NOTE — PROGRESS NOTES
HPI:   Charlotte Delcid is a 81year old female who presents for a Medicare Subsequent Annual Wellness visit (Pt already had Initial Annual Wellness).     Her last annual assessment has been over 1 year: Annual Physical due on 07/13/2020         Fall/Ris tobacco.    CAGE Alcohol screening   Chandler Patel was screened for Alcohol abuse and had a score of 0 so is at low risk.     Patient Care Team: Patient Care Team:  Tyler Diaz MD as PCP - General (Family Medicine)  Fadumo Rojas MD as PCP - [facility] has a past medical history of Abnormal LFTs (05/03/2016), Alzheimer disease ([facility] [address] Arthritis of right knee (11/3/2016), Brain atrophy ([facility] (10/16/2014), Dizziness, Dyslipidemia, Fatigue, Foot fracture, left (6/2/2015), Heart palpitations, Herpes, HTN (hyp questionnaire      Visual Acuity   normal                         General Appearance:  Alert, cooperative, no distress, appears stated age   Head:  Normocephalic, without obvious abnormality, atraumatic   Eyes:  PERRL, conjunctiva/corneas clear, EOM's [place] medical examination  -     CBC WITH DIFFERENTIAL WITH PLATELET; Future  -     COMP METABOLIC PANEL (14); Future  -     LIPID PANEL;  Future  -     VITAMIN D, 25-HYDROXY; Future  -     TSH W REFLEX TO FREE T4; Future  -     EVITA HANS 2D+3D SCREENING BILAT (CP flowsheet data found. Glaucoma Screening      Ophthalmology Visit Annually: Diabetics, FHx Glaucoma, AA>50, > 65 No flowsheet data found. Bone Density Screening      Dexascan Every two years No results found for this or any previous visit.  No

## 2020-09-29 ENCOUNTER — TELEPHONE (OUTPATIENT)
Dept: FAMILY MEDICINE CLINIC | Facility: CLINIC | Age: 70
End: 2020-09-29

## 2020-09-29 DIAGNOSIS — Z78.0 POSTMENOPAUSAL STATE: Primary | ICD-10-CM

## 2020-09-29 NOTE — TELEPHONE ENCOUNTER
Ana Warner from [facility] called to let Hakeem Guzmán know that the diagnosis code for pt's scheduled DEXA scan did not pass with Medicare. She is suggesting changing it to Z78.0 (post menopausal state)    Please advise, thank you!

## 2020-10-03 ENCOUNTER — HOSPITAL ENCOUNTER (OUTPATIENT)
Dept: BONE DENSITY | Age: 70
Discharge: HOME | End: 2020-10-03
Attending: NURSE PRACTITIONER
Payer: MEDICARE

## 2020-10-03 ENCOUNTER — HOSPITAL ENCOUNTER (OUTPATIENT)
Dept: MAMMOGRAPHY | Age: 70
Discharge: HOME | End: 2020-10-03
Attending: NURSE PRACTITIONER
Payer: MEDICARE

## 2020-10-03 DIAGNOSIS — Z12.31 BREAST CANCER SCREENING BY MAMMOGRAM: ICD-10-CM

## 2020-10-03 DIAGNOSIS — Z00.00 LABORATORY EXAM ORDERED AS PART OF ROUTINE GENERAL MEDICAL EXAMINATION: ICD-10-CM

## 2020-10-03 DIAGNOSIS — Z78.0 POSTMENOPAUSAL STATE: ICD-10-CM

## 2020-10-03 DIAGNOSIS — Z13.820 SCREENING FOR OSTEOPOROSIS: ICD-10-CM

## 2020-10-03 PROCEDURE — 77067 SCR MAMMO BI INCL CAD: CPT | Performed by: NURSE PRACTITIONER

## 2020-10-03 PROCEDURE — 77080 DXA BONE DENSITY AXIAL: CPT | Performed by: NURSE PRACTITIONER

## 2020-10-03 PROCEDURE — 77063 BREAST TOMOSYNTHESIS BI: CPT | Performed by: NURSE PRACTITIONER

## 2020-10-05 ENCOUNTER — TELEPHONE (OUTPATIENT)
Dept: FAMILY MEDICINE CLINIC | Facility: CLINIC | Age: 70
End: 2020-10-05

## 2020-10-05 NOTE — TELEPHONE ENCOUNTER
----- Message from Ettie Siemens, APRN sent at 10/5/2020  9:04 AM CDT -----  Negative Mammogram -f/u in 12 months

## 2020-10-05 NOTE — TELEPHONE ENCOUNTER
----- Message from SANGEETA Du sent at 10/5/2020  9:08 AM CDT -----  Osteoporosis - can schedule appointment for treatment options ( Prolia )

## 2020-10-06 NOTE — TELEPHONE ENCOUNTER
Spoke with patient's daughter via phone. Verified patient's daughter is on verbal consent form. Notified patient's daughter of below mammogram result and recommendation to repeat mammogram in 12 months. Patient's daughter verbalized understanding.  Notified

## 2020-11-21 ENCOUNTER — VIRTUAL PHONE E/M (OUTPATIENT)
Dept: FAMILY MEDICINE CLINIC | Facility: CLINIC | Age: 70
End: 2020-11-21

## 2020-11-21 DIAGNOSIS — M80.00XD AGE-RELATED OSTEOPOROSIS WITH CURRENT PATHOLOGICAL FRACTURE WITH ROUTINE HEALING, SUBSEQUENT ENCOUNTER: Primary | ICD-10-CM

## 2020-11-21 DIAGNOSIS — F02.80 EARLY ONSET ALZHEIMER'S DEMENTIA WITHOUT BEHAVIORAL DISTURBANCE (HCC): ICD-10-CM

## 2020-11-21 DIAGNOSIS — E55.9 VITAMIN D DEFICIENCY: ICD-10-CM

## 2020-11-21 DIAGNOSIS — E78.49 OTHER HYPERLIPIDEMIA: ICD-10-CM

## 2020-11-21 DIAGNOSIS — G30.0 EARLY ONSET ALZHEIMER'S DEMENTIA WITHOUT BEHAVIORAL DISTURBANCE (HCC): ICD-10-CM

## 2020-11-21 PROBLEM — M80.00XA AGE-RELATED OSTEOPOROSIS WITH CURRENT PATHOLOGICAL FRACTURE: Status: ACTIVE | Noted: 2020-11-21

## 2020-11-21 PROCEDURE — 99442 PHONE E/M BY PHYS 11-20 MIN: CPT | Performed by: FAMILY MEDICINE

## 2020-11-21 RX ORDER — ALENDRONATE SODIUM 70 MG/1
70 TABLET ORAL WEEKLY
Qty: 12 TABLET | Refills: 3 | Status: SHIPPED | OUTPATIENT
Start: 2020-11-21 | End: 2021-08-07

## 2020-11-21 NOTE — PROGRESS NOTES
Virtual Telephone Check-In    Janelle Jaimes verbally consents to a Virtual/Telephone Check-In visit on 11/21/20. Patient has been referred to the [facility] website at [email or web address] to review the yearly Consent to Treat document.     Patient under Toi Vazquez MD

## 2020-11-21 NOTE — PATIENT INSTRUCTIONS
Start calcium supplement 1200mg daily     Continue your vitamin D prescription dose - after finishing this prescription, please then take 2000 IU daily over-the-counter. Start Fosamax once a week. Make sure to take with at least 8 ounces of water.   [employer], [employer] [employer]. All rights reserved. This information is not intended as a substitute for professional medical care. Always follow your healthcare professional's instructions. What Is Osteoporosis?   Osteoporosis is a disease that weakens the bone ways to increase everyday safety. StayWell last reviewed this educational content on 3/1/2020  © 0590-9908 [employer]. [facility], [address] [place] [address]. All rights reserved.  This information is not intended as a substitute for pro

## 2020-12-17 DIAGNOSIS — E55.9 VITAMIN D DEFICIENCY: ICD-10-CM

## 2020-12-17 RX ORDER — ERGOCALCIFEROL 1.25 MG/1
CAPSULE ORAL
Qty: 4 CAPSULE | Refills: 3 | OUTPATIENT
Start: 2020-12-17

## 2021-03-12 DIAGNOSIS — Z23 NEED FOR VACCINATION: ICD-10-CM

## 2021-03-16 DIAGNOSIS — G30.0 EARLY ONSET ALZHEIMER'S DEMENTIA WITHOUT BEHAVIORAL DISTURBANCE (HCC): ICD-10-CM

## 2021-03-16 DIAGNOSIS — F02.80 EARLY ONSET ALZHEIMER'S DEMENTIA WITHOUT BEHAVIORAL DISTURBANCE (HCC): ICD-10-CM

## 2021-03-16 NOTE — TELEPHONE ENCOUNTER
Medication(s) to Refill:   Requested Prescriptions     Pending Prescriptions Disp Refills   • GALANTAMINE HYDROBROMIDE ER 24 MG Oral Capsule SR 24 Hr [Pharmacy Med Name: GALANTAMINE ER 24MG CAPSULES] 90 capsule 1     Sig: TAKE 1 CAPSULE(24 MG) BY MOUTH SEHRRY

## 2021-03-17 RX ORDER — GALANTAMINE HYDROBROMIDE 24 MG/1
CAPSULE, EXTENDED RELEASE ORAL
Qty: 90 CAPSULE | Refills: 1 | Status: SHIPPED | OUTPATIENT
Start: 2021-03-17 | End: 2021-06-22

## 2021-03-21 ENCOUNTER — IMMUNIZATION (OUTPATIENT)
Dept: LAB | Age: 71
End: 2021-03-21
Attending: HOSPITALIST
Payer: MEDICARE

## 2021-03-21 DIAGNOSIS — Z23 NEED FOR VACCINATION: Primary | ICD-10-CM

## 2021-03-21 PROCEDURE — 0001A SARSCOV2 VAC 30MCG/0.3ML IM: CPT

## 2021-04-11 ENCOUNTER — IMMUNIZATION (OUTPATIENT)
Dept: LAB | Age: 71
End: 2021-04-11
Attending: HOSPITALIST
Payer: MEDICARE

## 2021-04-11 DIAGNOSIS — Z23 NEED FOR VACCINATION: Primary | ICD-10-CM

## 2021-04-11 PROCEDURE — 0002A SARSCOV2 VAC 30MCG/0.3ML IM: CPT

## 2021-06-21 DIAGNOSIS — G30.0 EARLY ONSET ALZHEIMER'S DEMENTIA WITHOUT BEHAVIORAL DISTURBANCE (HCC): ICD-10-CM

## 2021-06-21 DIAGNOSIS — F02.80 EARLY ONSET ALZHEIMER'S DEMENTIA WITHOUT BEHAVIORAL DISTURBANCE (HCC): ICD-10-CM

## 2021-06-21 NOTE — TELEPHONE ENCOUNTER
Medication(s) to Refill:   Requested Prescriptions     Pending Prescriptions Disp Refills   • GALANTAMINE HYDROBROMIDE ER 24 MG Oral Capsule SR 24 Hr [Pharmacy Med Name: GALANTAMINE ER 24MG CAPSULES] 90 capsule 1     Sig: TAKE 1 CAPSULE(24 MG) BY MOUTH SHERRY

## 2021-06-22 RX ORDER — GALANTAMINE HYDROBROMIDE 24 MG/1
CAPSULE, EXTENDED RELEASE ORAL
Qty: 90 CAPSULE | Refills: 1 | Status: SHIPPED | OUTPATIENT
Start: 2021-06-22

## 2021-06-22 NOTE — TELEPHONE ENCOUNTER
I spoke with daughter Zane Rogers, informed her before pt's nxt refill  is requesting OV and labs be completed. Zane Rogers states she will inform her other sister to coordinate a time to come in. Nothing further.

## 2021-07-14 ENCOUNTER — TELEPHONE (OUTPATIENT)
Dept: FAMILY MEDICINE CLINIC | Facility: CLINIC | Age: 71
End: 2021-07-14

## 2021-08-07 DIAGNOSIS — M80.00XD AGE-RELATED OSTEOPOROSIS WITH CURRENT PATHOLOGICAL FRACTURE WITH ROUTINE HEALING, SUBSEQUENT ENCOUNTER: ICD-10-CM

## 2021-08-07 RX ORDER — ALENDRONATE SODIUM 70 MG/1
TABLET ORAL
Qty: 12 TABLET | Refills: 3 | Status: SHIPPED | OUTPATIENT
Start: 2021-08-07

## 2021-08-07 NOTE — TELEPHONE ENCOUNTER
Medication(s) to Refill:   Requested Prescriptions     Pending Prescriptions Disp Refills   • ALENDRONATE 70 MG Oral Tab [Pharmacy Med Name: ALENDRONATE 70MG TABLETS] 12 tablet 3     Sig: TAKE 1 TABLET(70 MG) BY MOUTH 1 TIME A WEEK         Reason for Medic

## 2021-09-15 ENCOUNTER — OFFICE VISIT (OUTPATIENT)
Dept: FAMILY MEDICINE CLINIC | Facility: CLINIC | Age: 71
End: 2021-09-15
Payer: MEDICARE

## 2021-09-15 VITALS
RESPIRATION RATE: 16 BRPM | HEIGHT: 62 IN | HEART RATE: 64 BPM | TEMPERATURE: 98 F | SYSTOLIC BLOOD PRESSURE: 100 MMHG | DIASTOLIC BLOOD PRESSURE: 60 MMHG | WEIGHT: 117 LBS | BODY MASS INDEX: 21.53 KG/M2 | OXYGEN SATURATION: 98 %

## 2021-09-15 DIAGNOSIS — M80.00XD AGE-RELATED OSTEOPOROSIS WITH CURRENT PATHOLOGICAL FRACTURE WITH ROUTINE HEALING, SUBSEQUENT ENCOUNTER: ICD-10-CM

## 2021-09-15 DIAGNOSIS — F02.80 EARLY ONSET ALZHEIMER'S DEMENTIA WITHOUT BEHAVIORAL DISTURBANCE (HCC): ICD-10-CM

## 2021-09-15 DIAGNOSIS — G30.0 EARLY ONSET ALZHEIMER'S DEMENTIA WITHOUT BEHAVIORAL DISTURBANCE (HCC): ICD-10-CM

## 2021-09-15 PROCEDURE — 99214 OFFICE O/P EST MOD 30 MIN: CPT | Performed by: FAMILY MEDICINE

## 2021-09-15 NOTE — PROGRESS NOTES
Subjective:   Crys Coffey is a 73year old female who presents for Medication Follow-Up     Medications     Osteoporosis   On bisphosphonate - no issues. Last dexa 10/2020    Dementia   - sister concerned that there is no improvement in her memory. as of this encounter: 117 lb (53.1 kg). Physical Exam  Cardiovascular:      Rate and Rhythm: Normal rate and regular rhythm. Pulmonary:      Effort: Pulmonary effort is normal.      Breath sounds: Normal breath sounds.    Neurological:      Mental Status

## 2021-09-15 NOTE — PATIENT INSTRUCTIONS
We recommend scheduling your annual wellness exam to discuss screenings you should complete like a mammogram, blood tests or immunizations you are due for.      Consider adding Namenda for treatment of dementia   Namenda -  See below from [email or web address]

## 2021-10-11 ENCOUNTER — TELEPHONE (OUTPATIENT)
Dept: FAMILY MEDICINE CLINIC | Facility: CLINIC | Age: 71
End: 2021-10-11

## 2021-10-20 ENCOUNTER — OFFICE VISIT (OUTPATIENT)
Dept: FAMILY MEDICINE CLINIC | Facility: CLINIC | Age: 71
End: 2021-10-20
Payer: MEDICARE

## 2021-10-20 ENCOUNTER — TELEPHONE (OUTPATIENT)
Dept: FAMILY MEDICINE CLINIC | Facility: CLINIC | Age: 71
End: 2021-10-20

## 2021-10-20 VITALS
BODY MASS INDEX: 22.09 KG/M2 | TEMPERATURE: 98 F | DIASTOLIC BLOOD PRESSURE: 72 MMHG | SYSTOLIC BLOOD PRESSURE: 132 MMHG | RESPIRATION RATE: 18 BRPM | HEART RATE: 72 BPM | HEIGHT: 61 IN | OXYGEN SATURATION: 98 % | WEIGHT: 117 LBS

## 2021-10-20 DIAGNOSIS — J06.9 VIRAL URI: Primary | ICD-10-CM

## 2021-10-20 DIAGNOSIS — Z11.52 ENCOUNTER FOR SCREENING FOR COVID-19: ICD-10-CM

## 2021-10-20 PROCEDURE — 99213 OFFICE O/P EST LOW 20 MIN: CPT | Performed by: NURSE PRACTITIONER

## 2021-10-20 NOTE — TELEPHONE ENCOUNTER
Requesting refill on galantamine hydrobromide.  6/22/2021 for 90 days and one refill. Left message for patient's daughter stating that there is a refill available and to call pharmacy.

## 2021-10-20 NOTE — TELEPHONE ENCOUNTER
Last office visit:  9/15/21 schedule appointment)        Requested medication:   GALANTAMINE HYDROBROMIDE ER 24 MG Oral Capsule SR 24 Hr  Pharmacy:    [facility] [facility], [address] [place] [address] AT [address], [phone]

## 2021-10-20 NOTE — PATIENT INSTRUCTIONS
Stay home pending COVID results. Push fluids and rest. You can continue tea with honey for cough. You can also try an over the counter cough medication if needed.    Follow up for any new or worsening symptoms such as fever, shortness of breath, or whee lungs.  · Over-the-counter cold medicines will not shorten the length of time you’re sick, but they may be helpful for the following symptoms: cough, sore throat, and nasal and sinus congestion.  If you take prescription medicines, ask your healthcare provi 14 days from the time of exposure and follow the below recommendations. If you test positive for COVID-19, you should notify your family and friends with whom you have had close contact recently (starting 2 days before you first had symptoms).      What co 1. Stay home from work, school, and away from other public places. If you must go out, avoid using any kind of public transportation, ridesharing, or taxis. 2. Monitor your symptoms carefully.  If your symptoms get worse, call your healthcare provider COVID-19, you should remain under home isolation precautions following the below guidelines:  • At least 24 hours have passed since recovery defined as resolution of fever without the use of fever-reducing medications; and  · Improvement in respiratory sym community who are most severely affected by the virus. How can I donate convalescent plasma? The process for donating plasma is very similar to donating blood.  [place] (a large blood research institute in [address] and one of [place]s bloo Patients with Post-COVID conditions may experience one or more of the following symptoms:    Persistent severe fatigue Brain fog or trouble concentrating   Headaches Sleeping difficulties   Anxiety or depression Loss of taste or smell   Chest pain  [place]

## 2021-10-20 NOTE — PROGRESS NOTES
CHIEF COMPLAINT:   Patient presents with:  Upper Respiratory Infection      HPI:   Gertrude Severance is a 73year old female accompanied by her daughter, who presents for upper respiratory symptoms for  5 days.  Patient reports sinus congestion, chest conge use: No        REVIEW OF SYSTEMS:   GENERAL: normal appetite  SKIN: no rashes or abnormal skin lesions  HEENT: See HPI  LUNGS: denies shortness of breath or wheezing, See HPI  CARDIOVASCULAR: denies chest pain or palpitations   GI: denies N/V/C or abdomina explained and discussed. Patient Instructions       Stay home pending COVID results. Push fluids and rest. You can continue tea with honey for cough. You can also try an over the counter cough medication if needed.    Follow up for any new or worsening loosen secretions in the nose and lungs. · Over-the-counter cold medicines will not shorten the length of time you’re sick, but they may be helpful for the following symptoms: cough, sore throat, and nasal and sinus congestion.  If you take prescription me COVID-19 should quarantine at home for 14 days from the time of exposure and follow the below recommendations.   If you test positive for COVID-19, you should notify your family and friends with whom you have had close contact recently (starting 2 days befo to Manage Your Health at Home      1. Stay home from work, school, and away from other public places. If you must go out, avoid using any kind of public transportation, ridesharing, or taxis. 2. Monitor your symptoms carefully.  If your symptoms get worse Isolation  If you have tested positive for COVID-19, you should remain under home isolation precautions following the below guidelines:  • At least 24 hours have passed since recovery defined as resolution of fever without the use of fever-reducing medicat used as a treatment for patients in our community who are most severely affected by the virus. How can I donate convalescent plasma? The process for donating plasma is very similar to donating blood.  [facility] in the  after being diagnosed with COVID-19.   Patients with Post-COVID conditions may experience one or more of the following symptoms:    Persistent severe fatigue Brain fog or trouble concentrating   Headaches Sleeping difficulties   Anxiety or depression Loss o

## 2021-11-02 ENCOUNTER — TELEPHONE (OUTPATIENT)
Dept: FAMILY MEDICINE CLINIC | Facility: CLINIC | Age: 71
End: 2021-11-02

## 2022-01-25 ENCOUNTER — TELEPHONE (OUTPATIENT)
Dept: FAMILY MEDICINE CLINIC | Facility: CLINIC | Age: 72
End: 2022-01-25

## 2022-01-25 NOTE — TELEPHONE ENCOUNTER
Spoke to patient's daughter Pat Cardoza, she will check with his sister to about when to bring her in

## 2022-02-01 ENCOUNTER — TELEPHONE (OUTPATIENT)
Dept: FAMILY MEDICINE CLINIC | Facility: CLINIC | Age: 72
End: 2022-02-01

## 2022-02-01 NOTE — TELEPHONE ENCOUNTER
Daughter called, her mother having an off day. - Woke up with dizziness, nausea, and palor. H910er blood pressure readings today were  183/79, 158/77, and 167/79. HR 60-62 and O2 sat-99%. No temperature taken. She is eating, though appetite is decreased, drinking well, does not know if urine has smell or not. Was positive for COVID about 3 weeks ago. Daughter would like to know if she should be worried about her blood pressure being elevated. Can not do a VV until after 6 PM when daughter who lives with her will be home. Or with Sanjeev Gonzáles the daughter who called here [phone] . Please advise.

## 2022-02-01 NOTE — TELEPHONE ENCOUNTER
BP is concerning - this is high and very elevated compared to typical readings. BP can be elevated due to pain or illness. Do they check BP daily? Or was it just checked because she wasn't feeling well? Any blood in stool? Dark maroon/black stools?    Can schedule appt on Saturday 11:40

## 2022-02-01 NOTE — TELEPHONE ENCOUNTER
Called the patient's daughter. Patient's daughter three-wayed with the patient. Patient states that the symptoms are gone. Denies nausea, vomiting, diarrhea, bloody stool, dark stool, or fever at this time. Denies pain at this time. Son-in-law currently at the house with patient (patient lives with daughter, Ayush Tucker). Most recent /62. Scheduled OV on 2/5/2022. Checked BP earlier because of symptoms. Educated the patient/patient's daughter on ER precautions. Patient/patient's daughter verbalized understanding.

## 2022-02-05 ENCOUNTER — OFFICE VISIT (OUTPATIENT)
Dept: FAMILY MEDICINE CLINIC | Facility: CLINIC | Age: 72
End: 2022-02-05
Payer: MEDICARE

## 2022-02-05 ENCOUNTER — LAB ENCOUNTER (OUTPATIENT)
Dept: LAB | Age: 72
End: 2022-02-05
Attending: FAMILY MEDICINE
Payer: MEDICARE

## 2022-02-05 VITALS
HEART RATE: 95 BPM | WEIGHT: 112 LBS | DIASTOLIC BLOOD PRESSURE: 84 MMHG | RESPIRATION RATE: 16 BRPM | HEIGHT: 61 IN | SYSTOLIC BLOOD PRESSURE: 138 MMHG | OXYGEN SATURATION: 99 % | BODY MASS INDEX: 21.14 KG/M2

## 2022-02-05 DIAGNOSIS — I10 PRIMARY HYPERTENSION: ICD-10-CM

## 2022-02-05 DIAGNOSIS — R42 DIZZINESS: ICD-10-CM

## 2022-02-05 DIAGNOSIS — I10 PRIMARY HYPERTENSION: Primary | ICD-10-CM

## 2022-02-05 LAB
ALBUMIN/GLOB SERPL: 1.4 {RATIO} (ref 1–2)
ALP LIVER SERPL-CCNC: 84 U/L
ALT SERPL-CCNC: 22 U/L
ANION GAP SERPL CALC-SCNC: 6 MMOL/L (ref 0–18)
AST SERPL-CCNC: 25 U/L (ref 15–37)
BASOPHILS # BLD AUTO: 0.06 X10(3) UL (ref 0–0.2)
BASOPHILS NFR BLD AUTO: 0.8 %
BILIRUB SERPL-MCNC: 0.5 MG/DL (ref 0.1–2)
BUN BLD-MCNC: 13 MG/DL (ref 7–18)
CHLORIDE SERPL-SCNC: 108 MMOL/L (ref 98–112)
CHOLEST SERPL-MCNC: 224 MG/DL (ref ?–200)
CO2 SERPL-SCNC: 27 MMOL/L (ref 21–32)
CREAT BLD-MCNC: 0.67 MG/DL
EOSINOPHIL # BLD AUTO: 0.07 X10(3) UL (ref 0–0.7)
EOSINOPHIL NFR BLD AUTO: 1 %
ERYTHROCYTE [DISTWIDTH] IN BLOOD BY AUTOMATED COUNT: 14.9 %
FASTING PATIENT LIPID ANSWER: NO
FASTING STATUS PATIENT QL REPORTED: NO
GLOBULIN PLAS-MCNC: 3 G/DL (ref 2.8–4.4)
GLUCOSE BLD-MCNC: 115 MG/DL (ref 70–99)
HCT VFR BLD AUTO: 41.4 %
HDLC SERPL-MCNC: 94 MG/DL (ref 40–59)
HGB BLD-MCNC: 13.9 G/DL
IMM GRANULOCYTES # BLD AUTO: 0.02 X10(3) UL (ref 0–1)
IMM GRANULOCYTES NFR BLD: 0.3 %
LDLC SERPL CALC-MCNC: 115 MG/DL (ref ?–100)
LYMPHOCYTES # BLD AUTO: 1.9 X10(3) UL (ref 1–4)
LYMPHOCYTES NFR BLD AUTO: 26.3 %
MCH RBC QN AUTO: 31 PG (ref 26–34)
MCHC RBC AUTO-ENTMCNC: 33.6 G/DL (ref 31–37)
MCV RBC AUTO: 92.2 FL
MONOCYTES # BLD AUTO: 0.54 X10(3) UL (ref 0.1–1)
MONOCYTES NFR BLD AUTO: 7.5 %
NEUTROPHILS # BLD AUTO: 4.64 X10 (3) UL (ref 1.5–7.7)
NEUTROPHILS # BLD AUTO: 4.64 X10(3) UL (ref 1.5–7.7)
NEUTROPHILS NFR BLD AUTO: 64.1 %
NONHDLC SERPL-MCNC: 130 MG/DL (ref ?–130)
OSMOLALITY SERPL CALC.SUM OF ELEC: 293 MOSM/KG (ref 275–295)
PLATELET # BLD AUTO: 195 10(3)UL (ref 150–450)
POTASSIUM SERPL-SCNC: 4.1 MMOL/L (ref 3.5–5.1)
PROT SERPL-MCNC: 7.1 G/DL (ref 6.4–8.2)
RBC # BLD AUTO: 4.49 X10(6)UL
SODIUM SERPL-SCNC: 141 MMOL/L (ref 136–145)
TRIGL SERPL-MCNC: 85 MG/DL (ref 30–149)
TSI SER-ACNC: 1.52 MIU/ML (ref 0.36–3.74)
VLDLC SERPL CALC-MCNC: 15 MG/DL (ref 0–30)
WBC # BLD AUTO: 7.2 X10(3) UL (ref 4–11)

## 2022-02-05 PROCEDURE — 80053 COMPREHEN METABOLIC PANEL: CPT

## 2022-02-05 PROCEDURE — 80061 LIPID PANEL: CPT

## 2022-02-05 PROCEDURE — 85025 COMPLETE CBC W/AUTO DIFF WBC: CPT

## 2022-02-05 PROCEDURE — 84443 ASSAY THYROID STIM HORMONE: CPT

## 2022-02-05 PROCEDURE — 99214 OFFICE O/P EST MOD 30 MIN: CPT | Performed by: FAMILY MEDICINE

## 2022-02-05 PROCEDURE — 36415 COLL VENOUS BLD VENIPUNCTURE: CPT

## 2022-02-05 RX ORDER — MECLIZINE HYDROCHLORIDE 25 MG/1
25 TABLET ORAL 3 TIMES DAILY PRN
Qty: 30 TABLET | Refills: 0 | Status: SHIPPED | OUTPATIENT
Start: 2022-02-05 | End: 2022-04-01 | Stop reason: ALTCHOICE

## 2022-02-05 RX ORDER — AMLODIPINE BESYLATE 2.5 MG/1
2.5 TABLET ORAL DAILY
Qty: 90 TABLET | Refills: 0 | Status: SHIPPED | OUTPATIENT
Start: 2022-02-05 | End: 2022-04-01

## 2022-02-07 ENCOUNTER — TELEPHONE (OUTPATIENT)
Dept: FAMILY MEDICINE CLINIC | Facility: CLINIC | Age: 72
End: 2022-02-07

## 2022-02-07 NOTE — TELEPHONE ENCOUNTER
----- Message from Ingrid Christian MD sent at 2/7/2022  6:15 AM CST -----  Results reviewed. Tests show no significant abnormalities. Please inform daughter per HIPAA.

## 2022-02-07 NOTE — TELEPHONE ENCOUNTER
Notified the patient's daughter of the below lab results. Patient's daughter verbalized understanding. Answered all questions at this time.

## 2022-02-21 ENCOUNTER — TELEPHONE (OUTPATIENT)
Dept: FAMILY MEDICINE CLINIC | Facility: CLINIC | Age: 72
End: 2022-02-21

## 2022-02-21 NOTE — TELEPHONE ENCOUNTER
Talked to [facility] about needing to schedule AWV, her sister handles these appointments, will have her call to schedule

## 2022-04-01 ENCOUNTER — OFFICE VISIT (OUTPATIENT)
Dept: FAMILY MEDICINE CLINIC | Facility: CLINIC | Age: 72
End: 2022-04-01
Payer: MEDICARE

## 2022-04-01 VITALS
DIASTOLIC BLOOD PRESSURE: 72 MMHG | HEIGHT: 61 IN | OXYGEN SATURATION: 100 % | WEIGHT: 116 LBS | BODY MASS INDEX: 21.9 KG/M2 | HEART RATE: 65 BPM | SYSTOLIC BLOOD PRESSURE: 170 MMHG | RESPIRATION RATE: 14 BRPM

## 2022-04-01 DIAGNOSIS — I10 PRIMARY HYPERTENSION: ICD-10-CM

## 2022-04-01 DIAGNOSIS — F02.80 EARLY ONSET ALZHEIMER'S DEMENTIA WITHOUT BEHAVIORAL DISTURBANCE (HCC): ICD-10-CM

## 2022-04-01 DIAGNOSIS — Z11.59 NEED FOR HEPATITIS C SCREENING TEST: ICD-10-CM

## 2022-04-01 DIAGNOSIS — Z00.00 ENCOUNTER FOR ANNUAL HEALTH EXAMINATION: Primary | ICD-10-CM

## 2022-04-01 DIAGNOSIS — M81.0 AGE-RELATED OSTEOPOROSIS WITHOUT CURRENT PATHOLOGICAL FRACTURE: ICD-10-CM

## 2022-04-01 DIAGNOSIS — Z12.31 ENCOUNTER FOR SCREENING MAMMOGRAM FOR MALIGNANT NEOPLASM OF BREAST: ICD-10-CM

## 2022-04-01 DIAGNOSIS — G30.0 EARLY ONSET ALZHEIMER'S DEMENTIA WITHOUT BEHAVIORAL DISTURBANCE (HCC): ICD-10-CM

## 2022-04-01 DIAGNOSIS — M20.61 TOE DEFORMITY, ACQUIRED, RIGHT: ICD-10-CM

## 2022-04-01 PROBLEM — Z47.89 ORTHOPEDIC AFTERCARE: Status: RESOLVED | Noted: 2019-10-29 | Resolved: 2022-04-01

## 2022-04-01 PROBLEM — M80.00XA AGE-RELATED OSTEOPOROSIS WITH CURRENT PATHOLOGICAL FRACTURE: Status: RESOLVED | Noted: 2020-11-21 | Resolved: 2022-04-01

## 2022-04-01 PROCEDURE — G0439 PPPS, SUBSEQ VISIT: HCPCS | Performed by: FAMILY MEDICINE

## 2022-04-01 RX ORDER — AMLODIPINE BESYLATE 2.5 MG/1
2.5 TABLET ORAL DAILY
Qty: 90 TABLET | Refills: 3 | Status: SHIPPED | OUTPATIENT
Start: 2022-04-01

## 2022-04-01 RX ORDER — GALANTAMINE HYDROBROMIDE 24 MG/1
24 CAPSULE, EXTENDED RELEASE ORAL
Qty: 90 CAPSULE | Refills: 3 | Status: SHIPPED | OUTPATIENT
Start: 2022-04-01

## 2022-04-17 PROBLEM — I10 PRIMARY HYPERTENSION: Status: ACTIVE | Noted: 2022-04-17

## 2022-04-18 RX ORDER — GALANTAMINE HYDROBROMIDE 24 MG/1
CAPSULE, EXTENDED RELEASE ORAL
Qty: 90 CAPSULE | Refills: 3 | OUTPATIENT
Start: 2022-04-18

## 2022-10-27 DIAGNOSIS — M80.00XD AGE-RELATED OSTEOPOROSIS WITH CURRENT PATHOLOGICAL FRACTURE WITH ROUTINE HEALING, SUBSEQUENT ENCOUNTER: ICD-10-CM

## 2022-10-28 RX ORDER — ALENDRONATE SODIUM 70 MG/1
TABLET ORAL
Qty: 12 TABLET | Refills: 3 | Status: SHIPPED | OUTPATIENT
Start: 2022-10-28

## 2023-03-29 ENCOUNTER — TELEPHONE (OUTPATIENT)
Dept: FAMILY MEDICINE CLINIC | Facility: CLINIC | Age: 73
End: 2023-03-29

## 2023-04-07 RX ORDER — AMLODIPINE BESYLATE 2.5 MG/1
2.5 TABLET ORAL DAILY
Qty: 90 TABLET | Refills: 0 | Status: SHIPPED | OUTPATIENT
Start: 2023-04-07 | End: 2023-07-06

## 2023-05-12 DIAGNOSIS — F02.80 EARLY ONSET ALZHEIMER'S DEMENTIA WITHOUT BEHAVIORAL DISTURBANCE (HCC): ICD-10-CM

## 2023-05-12 DIAGNOSIS — G30.0 EARLY ONSET ALZHEIMER'S DEMENTIA WITHOUT BEHAVIORAL DISTURBANCE (HCC): ICD-10-CM

## 2023-05-12 RX ORDER — GALANTAMINE HYDROBROMIDE 24 MG/1
CAPSULE, EXTENDED RELEASE ORAL
Qty: 90 CAPSULE | Refills: 0 | Status: SHIPPED | OUTPATIENT
Start: 2023-05-12

## 2023-05-15 DIAGNOSIS — I10 PRIMARY HYPERTENSION: Primary | ICD-10-CM

## 2023-05-15 DIAGNOSIS — E03.8 SUBCLINICAL HYPOTHYROIDISM: ICD-10-CM

## 2023-05-15 DIAGNOSIS — Z13.21 ENCOUNTER FOR VITAMIN DEFICIENCY SCREENING: ICD-10-CM

## 2023-05-22 ENCOUNTER — OFFICE VISIT (OUTPATIENT)
Dept: FAMILY MEDICINE CLINIC | Facility: CLINIC | Age: 73
End: 2023-05-22
Payer: MEDICARE

## 2023-05-22 VITALS
RESPIRATION RATE: 18 BRPM | OXYGEN SATURATION: 98 % | WEIGHT: 116 LBS | HEART RATE: 61 BPM | HEIGHT: 61 IN | BODY MASS INDEX: 21.9 KG/M2 | SYSTOLIC BLOOD PRESSURE: 170 MMHG | TEMPERATURE: 97 F | DIASTOLIC BLOOD PRESSURE: 90 MMHG

## 2023-05-22 DIAGNOSIS — G31.9 BRAIN ATROPHY (HCC): ICD-10-CM

## 2023-05-22 DIAGNOSIS — M80.00XD AGE-RELATED OSTEOPOROSIS WITH CURRENT PATHOLOGICAL FRACTURE WITH ROUTINE HEALING, SUBSEQUENT ENCOUNTER: ICD-10-CM

## 2023-05-22 DIAGNOSIS — Z78.0 POSTMENOPAUSAL: ICD-10-CM

## 2023-05-22 DIAGNOSIS — Z12.31 ENCOUNTER FOR SCREENING MAMMOGRAM FOR MALIGNANT NEOPLASM OF BREAST: ICD-10-CM

## 2023-05-22 DIAGNOSIS — F51.01 PRIMARY INSOMNIA: ICD-10-CM

## 2023-05-22 DIAGNOSIS — R35.0 URINE FREQUENCY: ICD-10-CM

## 2023-05-22 DIAGNOSIS — I10 PRIMARY HYPERTENSION: ICD-10-CM

## 2023-05-22 DIAGNOSIS — G30.0 EARLY ONSET ALZHEIMER'S DEMENTIA WITHOUT BEHAVIORAL DISTURBANCE (HCC): ICD-10-CM

## 2023-05-22 DIAGNOSIS — Z00.00 ENCOUNTER FOR ANNUAL HEALTH EXAMINATION: Primary | ICD-10-CM

## 2023-05-22 DIAGNOSIS — M17.11 ARTHRITIS OF RIGHT KNEE: ICD-10-CM

## 2023-05-22 DIAGNOSIS — G47.33 OSA (OBSTRUCTIVE SLEEP APNEA): ICD-10-CM

## 2023-05-22 DIAGNOSIS — F02.80 EARLY ONSET ALZHEIMER'S DEMENTIA WITHOUT BEHAVIORAL DISTURBANCE (HCC): ICD-10-CM

## 2023-05-22 DIAGNOSIS — E03.8 SUBCLINICAL HYPOTHYROIDISM: ICD-10-CM

## 2023-05-22 PROCEDURE — G0439 PPPS, SUBSEQ VISIT: HCPCS | Performed by: FAMILY MEDICINE

## 2023-05-22 PROCEDURE — 1126F AMNT PAIN NOTED NONE PRSNT: CPT | Performed by: FAMILY MEDICINE

## 2023-05-22 RX ORDER — AMLODIPINE BESYLATE 2.5 MG/1
2.5 TABLET ORAL DAILY
Qty: 90 TABLET | Refills: 1 | Status: SHIPPED | OUTPATIENT
Start: 2023-05-22 | End: 2023-11-18

## 2023-05-22 RX ORDER — GALANTAMINE HYDROBROMIDE 24 MG/1
24 CAPSULE, EXTENDED RELEASE ORAL
Qty: 90 CAPSULE | Refills: 1 | Status: SHIPPED | OUTPATIENT
Start: 2023-05-22

## 2023-05-22 RX ORDER — ALENDRONATE SODIUM 70 MG/1
70 TABLET ORAL WEEKLY
Qty: 12 TABLET | Refills: 3 | Status: SHIPPED | OUTPATIENT
Start: 2023-05-22

## 2023-05-22 RX ORDER — HYDRALAZINE HYDROCHLORIDE 25 MG/1
25 TABLET, FILM COATED ORAL 3 TIMES DAILY PRN
Qty: 90 TABLET | Refills: 0 | Status: SHIPPED | OUTPATIENT
Start: 2023-05-22

## 2023-06-29 PROBLEM — Z23 NEED FOR VACCINATION FOR STREP PNEUMONIAE: Status: RESOLVED | Noted: 2018-03-31 | Resolved: 2023-06-29

## 2023-07-26 ENCOUNTER — TELEPHONE (OUTPATIENT)
Dept: SURGERY | Facility: CLINIC | Age: 73
End: 2023-07-26

## 2023-07-26 NOTE — TELEPHONE ENCOUNTER
Rcvd referral for pt from Dr. Jackson Hall; Reached out to pt to schedule appointment spoke to pt daughter-Aleshia which stated will talk to her sister and cb to schedule appointment

## 2023-11-09 DIAGNOSIS — F02.80 EARLY ONSET ALZHEIMER'S DEMENTIA WITHOUT BEHAVIORAL DISTURBANCE (HCC): ICD-10-CM

## 2023-11-09 DIAGNOSIS — G30.0 EARLY ONSET ALZHEIMER'S DEMENTIA WITHOUT BEHAVIORAL DISTURBANCE (HCC): ICD-10-CM

## 2023-11-09 RX ORDER — GALANTAMINE HYDROBROMIDE 24 MG/1
24 CAPSULE, EXTENDED RELEASE ORAL
Qty: 90 CAPSULE | Refills: 1 | Status: SHIPPED | OUTPATIENT
Start: 2023-11-09

## 2024-01-16 NOTE — TELEPHONE ENCOUNTER
90 day supply sent.   LOV: 5/22/23   RTC in 4 wks for BP (6/19/23)  Never followed up.      PSR: Pls contact pt to schedule HTN f/u.

## 2024-01-26 RX ORDER — AMLODIPINE BESYLATE 2.5 MG/1
2.5 TABLET ORAL DAILY
Qty: 90 TABLET | Refills: 0 | Status: SHIPPED | OUTPATIENT
Start: 2024-01-26

## 2024-01-31 ENCOUNTER — TELEPHONE (OUTPATIENT)
Dept: FAMILY MEDICINE CLINIC | Facility: CLINIC | Age: 74
End: 2024-01-31

## 2024-02-06 ENCOUNTER — OFFICE VISIT (OUTPATIENT)
Dept: FAMILY MEDICINE CLINIC | Facility: CLINIC | Age: 74
End: 2024-02-06
Payer: MEDICARE

## 2024-02-06 VITALS
SYSTOLIC BLOOD PRESSURE: 150 MMHG | TEMPERATURE: 98 F | HEART RATE: 60 BPM | RESPIRATION RATE: 16 BRPM | WEIGHT: 116 LBS | DIASTOLIC BLOOD PRESSURE: 60 MMHG | HEIGHT: 61 IN | BODY MASS INDEX: 21.9 KG/M2 | OXYGEN SATURATION: 99 %

## 2024-02-06 DIAGNOSIS — F02.80 EARLY ONSET ALZHEIMER'S DEMENTIA WITHOUT BEHAVIORAL DISTURBANCE (HCC): ICD-10-CM

## 2024-02-06 DIAGNOSIS — I10 PRIMARY HYPERTENSION: Primary | ICD-10-CM

## 2024-02-06 DIAGNOSIS — G30.0 EARLY ONSET ALZHEIMER'S DEMENTIA WITHOUT BEHAVIORAL DISTURBANCE (HCC): ICD-10-CM

## 2024-02-06 PROCEDURE — 99214 OFFICE O/P EST MOD 30 MIN: CPT | Performed by: FAMILY MEDICINE

## 2024-02-06 RX ORDER — GALANTAMINE HYDROBROMIDE 24 MG/1
24 CAPSULE, EXTENDED RELEASE ORAL
Qty: 90 CAPSULE | Refills: 1 | Status: SHIPPED | OUTPATIENT
Start: 2024-02-06

## 2024-02-06 NOTE — PROGRESS NOTES
Subjective:   Ching Rhoades is a 73 year old female who presents for Blood Pressure (F/u)     HTN   Home readings average 140/60   Daughter states BP has always been above 140s sbp   Denies chest pain, headaches, visual loss, dyspnea.  Weakness or falls.  Daughter feels that patient eats ham frequently - sometimes twice a day   Denies canned soups or frozen foods regularly     Dementia   Relatively unchanged     History/Other:    Chief Complaint Reviewed and Verified  Nursing Notes Reviewed and   Verified  Tobacco Reviewed  Allergies Reviewed  Medications Reviewed    Problem List Reviewed  Medical History Reviewed  Surgical History   Reviewed  OB Status Reviewed  Family History Reviewed  Social History   Reviewed         Tobacco:  She has never smoked tobacco.    Current Outpatient Medications   Medication Sig Dispense Refill    Galantamine Hydrobromide ER 24 MG Oral Capsule SR 24 Hr Take 1 capsule (24 mg total) by mouth daily with breakfast. 90 capsule 1    amLODIPine 2.5 MG Oral Tab Take 1 tablet (2.5 mg total) by mouth daily. 90 tablet 0    alendronate 70 MG Oral Tab Take 1 tablet (70 mg total) by mouth once a week. 12 tablet 3    hydrALAZINE 25 MG Oral Tab Take 1 tablet (25 mg total) by mouth 3 (three) times daily as needed (for SBP above 160). 90 tablet 0    Probiotic Product (PROBIOTIC OR) Take by mouth.           Review of Systems:  Review of Systems   Constitutional:  Negative for chills and fever.   HENT:  Negative for rhinorrhea and sinus pressure.    Eyes:  Negative for pain and visual disturbance.   Respiratory:  Negative for cough and shortness of breath.    Cardiovascular:  Negative for chest pain and palpitations.   Gastrointestinal:  Negative for abdominal pain, nausea and vomiting.   Genitourinary:  Negative for dysuria, frequency and urgency.   Musculoskeletal:  Negative for arthralgias and myalgias.   Skin:  Negative for pallor and rash.   Neurological:  Negative for dizziness and  headaches.       Objective:   /60   Pulse 60   Temp 98 °F (36.7 °C)   Resp 16   Ht 5' 1\" (1.549 m)   Wt 116 lb (52.6 kg)   LMP  (LMP Unknown)   SpO2 99%   BMI 21.92 kg/m²  Estimated body mass index is 21.92 kg/m² as calculated from the following:    Height as of this encounter: 5' 1\" (1.549 m).    Weight as of this encounter: 116 lb (52.6 kg).  Physical Exam  Constitutional:       Appearance: Normal appearance. She is well-developed and well-groomed.   Cardiovascular:      Rate and Rhythm: Normal rate and regular rhythm.      Pulses: Normal pulses.      Heart sounds: Normal heart sounds.   Pulmonary:      Effort: Pulmonary effort is normal.      Breath sounds: Normal breath sounds.   Neurological:      General: No focal deficit present.      Mental Status: She is alert and oriented to person, place, and time. Mental status is at baseline.   Psychiatric:         Mood and Affect: Mood and affect normal.         Behavior: Behavior normal. Behavior is cooperative.         Assessment & Plan:   1. Primary hypertension (Primary)  2. Early onset Alzheimer's dementia without behavioral disturbance (HCC)  -     Galantamine Hydrobromide ER; Take 1 capsule (24 mg total) by mouth daily with breakfast.  Dispense: 90 capsule; Refill: 1    Discussed increasing dose   Patient/family would like to try eliminating salty meats first - deli meats & ham   Will cont to monitor BP at home   If not improved in 3 mo - then increase amlodipine dose   Complete fasting labs before follow up       Return in about 3 months (around 5/6/2024) for annual physical.    ALFIE LOMBARDI MD, 2/6/2024, 10:19 AM

## 2024-06-03 RX ORDER — AMLODIPINE BESYLATE 2.5 MG/1
2.5 TABLET ORAL DAILY
Qty: 90 TABLET | Refills: 1 | Status: SHIPPED | OUTPATIENT
Start: 2024-06-03

## 2024-06-24 ENCOUNTER — TELEPHONE (OUTPATIENT)
Dept: FAMILY MEDICINE CLINIC | Facility: CLINIC | Age: 74
End: 2024-06-24

## 2024-06-24 DIAGNOSIS — E55.9 VITAMIN D DEFICIENCY: ICD-10-CM

## 2024-06-24 DIAGNOSIS — Z13.21 ENCOUNTER FOR VITAMIN DEFICIENCY SCREENING: ICD-10-CM

## 2024-06-24 DIAGNOSIS — I10 PRIMARY HYPERTENSION: Primary | ICD-10-CM

## 2024-06-24 DIAGNOSIS — E03.8 SUBCLINICAL HYPOTHYROIDISM: ICD-10-CM

## 2024-07-01 ENCOUNTER — OFFICE VISIT (OUTPATIENT)
Dept: FAMILY MEDICINE CLINIC | Facility: CLINIC | Age: 74
End: 2024-07-01
Payer: MEDICARE

## 2024-07-01 ENCOUNTER — LAB ENCOUNTER (OUTPATIENT)
Dept: LAB | Age: 74
End: 2024-07-01
Attending: FAMILY MEDICINE
Payer: MEDICARE

## 2024-07-01 VITALS
DIASTOLIC BLOOD PRESSURE: 60 MMHG | RESPIRATION RATE: 19 BRPM | HEIGHT: 61 IN | SYSTOLIC BLOOD PRESSURE: 132 MMHG | BODY MASS INDEX: 21.52 KG/M2 | WEIGHT: 114 LBS | OXYGEN SATURATION: 99 % | HEART RATE: 70 BPM

## 2024-07-01 DIAGNOSIS — M81.0 AGE-RELATED OSTEOPOROSIS WITHOUT CURRENT PATHOLOGICAL FRACTURE: ICD-10-CM

## 2024-07-01 DIAGNOSIS — G47.33 OSA (OBSTRUCTIVE SLEEP APNEA): ICD-10-CM

## 2024-07-01 DIAGNOSIS — I10 PRIMARY HYPERTENSION: ICD-10-CM

## 2024-07-01 DIAGNOSIS — M17.11 ARTHRITIS OF RIGHT KNEE: ICD-10-CM

## 2024-07-01 DIAGNOSIS — E03.8 SUBCLINICAL HYPOTHYROIDISM: ICD-10-CM

## 2024-07-01 DIAGNOSIS — G30.0 EARLY ONSET ALZHEIMER'S DEMENTIA WITHOUT BEHAVIORAL DISTURBANCE (HCC): ICD-10-CM

## 2024-07-01 DIAGNOSIS — Z00.00 ENCOUNTER FOR ANNUAL HEALTH EXAMINATION: Primary | ICD-10-CM

## 2024-07-01 DIAGNOSIS — E55.9 VITAMIN D DEFICIENCY: ICD-10-CM

## 2024-07-01 DIAGNOSIS — G31.9 BRAIN ATROPHY (HCC): ICD-10-CM

## 2024-07-01 DIAGNOSIS — Z12.31 ENCOUNTER FOR SCREENING MAMMOGRAM FOR BREAST CANCER: ICD-10-CM

## 2024-07-01 DIAGNOSIS — F02.80 EARLY ONSET ALZHEIMER'S DEMENTIA WITHOUT BEHAVIORAL DISTURBANCE (HCC): ICD-10-CM

## 2024-07-01 DIAGNOSIS — Z78.0 MENOPAUSE: ICD-10-CM

## 2024-07-01 LAB
ALBUMIN SERPL-MCNC: 4 G/DL (ref 3.4–5)
ALBUMIN/GLOB SERPL: 1.2 {RATIO} (ref 1–2)
ALP LIVER SERPL-CCNC: 101 U/L
ALT SERPL-CCNC: 27 U/L
ANION GAP SERPL CALC-SCNC: 6 MMOL/L (ref 0–18)
AST SERPL-CCNC: 22 U/L (ref 15–37)
BASOPHILS # BLD AUTO: 0.05 X10(3) UL (ref 0–0.2)
BASOPHILS NFR BLD AUTO: 0.9 %
BILIRUB SERPL-MCNC: 0.6 MG/DL (ref 0.1–2)
BUN BLD-MCNC: 19 MG/DL (ref 9–23)
CALCIUM BLD-MCNC: 9.7 MG/DL (ref 8.5–10.1)
CHLORIDE SERPL-SCNC: 108 MMOL/L (ref 98–112)
CHOLEST SERPL-MCNC: 234 MG/DL (ref ?–200)
CO2 SERPL-SCNC: 27 MMOL/L (ref 21–32)
CREAT BLD-MCNC: 0.77 MG/DL
EGFRCR SERPLBLD CKD-EPI 2021: 82 ML/MIN/1.73M2 (ref 60–?)
EOSINOPHIL # BLD AUTO: 0.25 X10(3) UL (ref 0–0.7)
EOSINOPHIL NFR BLD AUTO: 4.4 %
ERYTHROCYTE [DISTWIDTH] IN BLOOD BY AUTOMATED COUNT: 14.5 %
FASTING PATIENT LIPID ANSWER: YES
FASTING STATUS PATIENT QL REPORTED: YES
GLOBULIN PLAS-MCNC: 3.3 G/DL (ref 2.8–4.4)
GLUCOSE BLD-MCNC: 77 MG/DL (ref 70–99)
HCT VFR BLD AUTO: 42.8 %
HDLC SERPL-MCNC: 99 MG/DL (ref 40–59)
HGB BLD-MCNC: 13.9 G/DL
IMM GRANULOCYTES # BLD AUTO: 0.01 X10(3) UL (ref 0–1)
IMM GRANULOCYTES NFR BLD: 0.2 %
LDLC SERPL CALC-MCNC: 118 MG/DL (ref ?–100)
LYMPHOCYTES # BLD AUTO: 1.89 X10(3) UL (ref 1–4)
LYMPHOCYTES NFR BLD AUTO: 33.2 %
MCH RBC QN AUTO: 28.8 PG (ref 26–34)
MCHC RBC AUTO-ENTMCNC: 32.5 G/DL (ref 31–37)
MCV RBC AUTO: 88.6 FL
MONOCYTES # BLD AUTO: 0.57 X10(3) UL (ref 0.1–1)
MONOCYTES NFR BLD AUTO: 10 %
NEUTROPHILS # BLD AUTO: 2.92 X10 (3) UL (ref 1.5–7.7)
NEUTROPHILS # BLD AUTO: 2.92 X10(3) UL (ref 1.5–7.7)
NEUTROPHILS NFR BLD AUTO: 51.3 %
NONHDLC SERPL-MCNC: 135 MG/DL (ref ?–130)
OSMOLALITY SERPL CALC.SUM OF ELEC: 293 MOSM/KG (ref 275–295)
PLATELET # BLD AUTO: 241 10(3)UL (ref 150–450)
POTASSIUM SERPL-SCNC: 4.4 MMOL/L (ref 3.5–5.1)
PROT SERPL-MCNC: 7.3 G/DL (ref 6.4–8.2)
RBC # BLD AUTO: 4.83 X10(6)UL
SODIUM SERPL-SCNC: 141 MMOL/L (ref 136–145)
TRIGL SERPL-MCNC: 103 MG/DL (ref 30–149)
TSI SER-ACNC: 1.83 MIU/ML (ref 0.36–3.74)
VIT D+METAB SERPL-MCNC: 17.4 NG/ML (ref 30–100)
VLDLC SERPL CALC-MCNC: 18 MG/DL (ref 0–30)
WBC # BLD AUTO: 5.7 X10(3) UL (ref 4–11)

## 2024-07-01 PROCEDURE — 84443 ASSAY THYROID STIM HORMONE: CPT

## 2024-07-01 PROCEDURE — 82306 VITAMIN D 25 HYDROXY: CPT

## 2024-07-01 PROCEDURE — 80061 LIPID PANEL: CPT

## 2024-07-01 PROCEDURE — 85025 COMPLETE CBC W/AUTO DIFF WBC: CPT

## 2024-07-01 PROCEDURE — 36415 COLL VENOUS BLD VENIPUNCTURE: CPT

## 2024-07-01 PROCEDURE — 80053 COMPREHEN METABOLIC PANEL: CPT

## 2024-07-01 RX ORDER — AMLODIPINE BESYLATE 5 MG/1
5 TABLET ORAL DAILY
Qty: 90 TABLET | Refills: 3 | Status: SHIPPED | OUTPATIENT
Start: 2024-07-01

## 2024-07-03 ENCOUNTER — TELEPHONE (OUTPATIENT)
Dept: FAMILY MEDICINE CLINIC | Facility: CLINIC | Age: 74
End: 2024-07-03

## 2024-07-03 DIAGNOSIS — E78.00 ELEVATED LDL CHOLESTEROL LEVEL: Primary | ICD-10-CM

## 2024-07-03 NOTE — TELEPHONE ENCOUNTER
----- Message from ALFIE LOMBARDI sent at 7/2/2024  7:26 AM CDT -----  Notify patient/family   Inc vitmain D supplement   Rec starting atorvastatin 10mg nightly       The 10-year ASCVD risk score (Emmanuel PARSONS, et al., 2019) is: 14.1%    Values used to calculate the score:      Age: 73 years      Sex: Female      Is Non- : No      Diabetic: No      Tobacco smoker: No      Systolic Blood Pressure: 132 mmHg      Is BP treated: Yes      HDL Cholesterol: 99 mg/dL      Total Cholesterol: 234 mg/dL

## 2024-07-03 NOTE — TELEPHONE ENCOUNTER
Called daughter and informed of below response from provider.   Daughter verbalized understanding  Labs ordered

## 2024-07-03 NOTE — TELEPHONE ENCOUNTER
Spoke to patient's daughter hesitant on the cholesterol med wants to try diet first.  Ok to repeat lipid in 6 months?    Also, what dose of Vit D did you want for patient?

## 2024-08-12 DIAGNOSIS — F02.80 EARLY ONSET ALZHEIMER'S DEMENTIA WITHOUT BEHAVIORAL DISTURBANCE (HCC): ICD-10-CM

## 2024-08-12 DIAGNOSIS — G30.0 EARLY ONSET ALZHEIMER'S DEMENTIA WITHOUT BEHAVIORAL DISTURBANCE (HCC): ICD-10-CM

## 2024-08-12 RX ORDER — GALANTAMINE HYDROBROMIDE 24 MG/1
24 CAPSULE, EXTENDED RELEASE ORAL
Qty: 90 CAPSULE | Refills: 1 | Status: SHIPPED | OUTPATIENT
Start: 2024-08-12

## 2024-08-12 NOTE — TELEPHONE ENCOUNTER
Medication(s) to Refill:   Requested Prescriptions     Pending Prescriptions Disp Refills    GALANTAMINE HYDROBROMIDE ER 24 MG Oral Capsule SR 24 Hr [Pharmacy Med Name: GALANTAMINE ER 24MG CAPSULES] 90 capsule 1     Sig: TAKE 1 CAPSULE(24 MG) BY MOUTH DAILY WITH BREAKFAST         Reason for Medication Refill being sent to Provider / Reason Protocol Failed:  [] 90 day refill has already been granted  [] Blood Pressure out of range  [] Labs Abnormal/over due  [] Medication not previously prescribed by Provider  [] Non-Protocol Medication  [] Controlled Substance   [] Due for appointment- no future appointment scheduled  [] No Follow up specified      Last Time Medication was Filled:  2/6/24      Last Office Visit with PCP: 7/1/24    When Patient was Due Back to the Office:  6 months  (from when PCP last addressed condition)    Future Appointments:  No future appointments.      Last Blood Pressures:  BP Readings from Last 2 Encounters:   07/01/24 132/60   02/06/24 150/60         Action taken:  [] Refill approved per protocol  [] Routing to provider for approval

## 2024-09-23 RX ORDER — AMLODIPINE BESYLATE 2.5 MG/1
2.5 TABLET ORAL DAILY
Qty: 90 TABLET | Refills: 1 | OUTPATIENT
Start: 2024-09-23

## 2024-10-28 DIAGNOSIS — M80.00XD AGE-RELATED OSTEOPOROSIS WITH CURRENT PATHOLOGICAL FRACTURE WITH ROUTINE HEALING, SUBSEQUENT ENCOUNTER: ICD-10-CM

## 2024-10-28 RX ORDER — ALENDRONATE SODIUM 70 MG/1
70 TABLET ORAL WEEKLY
Qty: 12 TABLET | Refills: 3 | Status: SHIPPED | OUTPATIENT
Start: 2024-10-28

## 2024-10-28 NOTE — TELEPHONE ENCOUNTER
Ching Rhoades requesting Medication Refill for:    alendronate 70 MG Oral Tab [[id number]]     Last refill : 05/22/2023    Ching Rhoades requesting Medication Refill for:    LOV: 7/1/2024     [id number] [address] AT [facility] [address], [phone], [phone]

## 2025-01-09 ENCOUNTER — TELEPHONE (OUTPATIENT)
Dept: FAMILY MEDICINE CLINIC | Facility: CLINIC | Age: 75
End: 2025-01-09

## 2025-01-09 NOTE — TELEPHONE ENCOUNTER
Daughter Jacqui called (ok per HIPAA). She states patient has appointment tomorrow for jaw popping. She was advised by someone that patient should see her dentist rather than doctor. Advised patient it might be symptom of TMJ but would need to evaluate for diagnosis. She can try to contact dentist to see if they manage TMJ and can see patient. If not would recommend keeping appointment as scheduled.

## 2025-01-10 ENCOUNTER — OFFICE VISIT (OUTPATIENT)
Dept: FAMILY MEDICINE CLINIC | Facility: CLINIC | Age: 75
End: 2025-01-10
Payer: MEDICARE

## 2025-01-10 VITALS
HEART RATE: 80 BPM | BODY MASS INDEX: 20.98 KG/M2 | SYSTOLIC BLOOD PRESSURE: 130 MMHG | WEIGHT: 114 LBS | DIASTOLIC BLOOD PRESSURE: 82 MMHG | HEIGHT: 62 IN | OXYGEN SATURATION: 97 %

## 2025-01-10 DIAGNOSIS — M81.0 AGE-RELATED OSTEOPOROSIS WITHOUT CURRENT PATHOLOGICAL FRACTURE: ICD-10-CM

## 2025-01-10 DIAGNOSIS — I10 PRIMARY HYPERTENSION: Primary | ICD-10-CM

## 2025-01-10 DIAGNOSIS — M26.609 TEMPOROMANDIBULAR JOINT DISORDER (TMJ): ICD-10-CM

## 2025-01-10 DIAGNOSIS — G30.0 EARLY ONSET ALZHEIMER'S DEMENTIA WITHOUT BEHAVIORAL DISTURBANCE (HCC): ICD-10-CM

## 2025-01-10 DIAGNOSIS — M21.612 BUNION OF GREAT TOE OF LEFT FOOT: ICD-10-CM

## 2025-01-10 DIAGNOSIS — F02.80 EARLY ONSET ALZHEIMER'S DEMENTIA WITHOUT BEHAVIORAL DISTURBANCE (HCC): ICD-10-CM

## 2025-01-10 PROCEDURE — G2211 COMPLEX E/M VISIT ADD ON: HCPCS | Performed by: FAMILY MEDICINE

## 2025-01-10 PROCEDURE — 99214 OFFICE O/P EST MOD 30 MIN: CPT | Performed by: FAMILY MEDICINE

## 2025-01-10 RX ORDER — AMLODIPINE BESYLATE 5 MG/1
5 TABLET ORAL DAILY
Qty: 90 TABLET | Refills: 3 | Status: SHIPPED | OUTPATIENT
Start: 2025-01-10

## 2025-01-10 NOTE — PROGRESS NOTES
Subjective:   Ching Rhoades is a 74 year old female who presents for Medication Follow-Up (Needs a refill on medications/Also 6 month follow up /Also her jaw has been popping a lot has an opal with the dentist for that )     Jaw popping   Daughter very bothered by it   Patient has discussed with family in the last 1-2 mo   No painful but distressing   Started about 3 years go     HTN   Pt has been taking medications as instructed, no medication side effects; denies chest pain or dyspnea, denies persistent or worsening headache, denies lightheadedness or weakness.       Dementia   Filling medication pill box   Forgetting to eat sometimes   Does light housework   Completes crossword puzzles         History/Other:    Chief Complaint Reviewed and Verified  Nursing Notes Reviewed and   Verified  Tobacco Reviewed  Allergies Reviewed  Medications Reviewed    Problem List Reviewed  Medical History Reviewed  Surgical History   Reviewed  Family History Reviewed  Social History Reviewed         Tobacco:  She has never smoked tobacco.    Current Outpatient Medications   Medication Sig Dispense Refill    amLODIPine 5 MG Oral Tab Take 1 tablet (5 mg total) by mouth daily. 90 tablet 3    alendronate 70 MG Oral Tab Take 1 tablet (70 mg total) by mouth once a week. 12 tablet 3    GALANTAMINE HYDROBROMIDE ER 24 MG Oral Capsule SR 24 Hr TAKE 1 CAPSULE(24 MG) BY MOUTH DAILY WITH BREAKFAST 90 capsule 1    Probiotic Product (PROBIOTIC OR) Take by mouth.      hydrALAZINE 25 MG Oral Tab Take 1 tablet (25 mg total) by mouth 3 (three) times daily as needed (for SBP above 160). (Patient not taking: Reported on 1/10/2025) 90 tablet 0         Review of Systems:  Review of Systems   All other systems reviewed and are negative.        Objective:   /82   Pulse 80   Ht 5' 2\" (1.575 m)   Wt 114 lb (51.7 kg)   LMP  (LMP Unknown)   SpO2 97%   BMI 20.85 kg/m²  Estimated body mass index is 20.85 kg/m² as calculated from the  following:    Height as of this encounter: 5' 2\" (1.575 m).    Weight as of this encounter: 114 lb (51.7 kg).  Physical Exam  Constitutional:       Appearance: She is normal weight. She is not ill-appearing or diaphoretic.   HENT:      Head:      Jaw: There is normal jaw occlusion. No trismus, tenderness, swelling or pain on movement.      Right Ear: Tympanic membrane normal.      Left Ear: Tympanic membrane normal.   Cardiovascular:      Rate and Rhythm: Normal rate and regular rhythm.      Pulses: Normal pulses.      Heart sounds: Normal heart sounds. No murmur heard.  Musculoskeletal:      Right foot: Normal capillary refill. Bunion present.      Left foot: Normal capillary refill. Bunion present.   Lymphadenopathy:      Cervical: No cervical adenopathy.   Neurological:      Mental Status: She is alert.           Assessment & Plan:   1. Primary hypertension (Primary)  -     TSH W Reflex To Free T4; Future; Expected date: 01/10/2025  Controlled   2. Early onset Alzheimer's dementia without behavioral disturbance (HCC)  Controlled   3. Temporomandibular joint disorder (TMJ)  Supportive care   Follow up with dentist  4. Bunion of great toe of left foot  Discussed supportive care   See podiatry if worsens (but does not want to have surgery)  5. Age-related osteoporosis without current pathological fracture  Cont fosamax   Exercise as tolerated     Other orders  -     amLODIPine Besylate; Take 1 tablet (5 mg total) by mouth daily.  Dispense: 90 tablet; Refill: 3        Return in about 6 months (around 7/10/2025).    ALFIE LOMBARDI MD, 1/10/2025, 10:49 AM

## 2025-01-17 DIAGNOSIS — G30.0 EARLY ONSET ALZHEIMER'S DEMENTIA WITHOUT BEHAVIORAL DISTURBANCE (HCC): ICD-10-CM

## 2025-01-17 DIAGNOSIS — F02.80 EARLY ONSET ALZHEIMER'S DEMENTIA WITHOUT BEHAVIORAL DISTURBANCE (HCC): ICD-10-CM

## 2025-01-17 NOTE — TELEPHONE ENCOUNTER
Ching Rhoades requesting Medication Refill for:    Medication name and dose (copy and paste from medication list): GALANTAMINE HYDROBROMIDE ER 24 MG Oral Capsule SR 24 Hr     If medication is not on medication list - transfer patient to RN queue for triage    Preferred Pharmacy: [facility] #[id number] [place], [place]     LOV: 1/10/2025   Last Refill date: 8/12/24  Next Scheduled appointment:

## 2025-01-20 RX ORDER — GALANTAMINE HYDROBROMIDE 24 MG/1
24 CAPSULE, EXTENDED RELEASE ORAL
Qty: 90 CAPSULE | Refills: 3 | Status: SHIPPED | OUTPATIENT
Start: 2025-01-20

## 2025-05-29 ENCOUNTER — TELEPHONE (OUTPATIENT)
Dept: FAMILY MEDICINE CLINIC | Facility: CLINIC | Age: 75
End: 2025-05-29

## 2025-07-14 ENCOUNTER — HOSPITAL ENCOUNTER (OUTPATIENT)
Dept: GENERAL RADIOLOGY | Age: 75
Discharge: HOME | End: 2025-07-14
Attending: NURSE PRACTITIONER
Payer: MEDICARE

## 2025-07-14 ENCOUNTER — OFFICE VISIT (OUTPATIENT)
Dept: FAMILY MEDICINE CLINIC | Facility: CLINIC | Age: 75
End: 2025-07-14
Payer: MEDICARE

## 2025-07-14 VITALS
DIASTOLIC BLOOD PRESSURE: 60 MMHG | HEIGHT: 62 IN | BODY MASS INDEX: 21.35 KG/M2 | WEIGHT: 116 LBS | SYSTOLIC BLOOD PRESSURE: 140 MMHG | OXYGEN SATURATION: 98 % | RESPIRATION RATE: 16 BRPM | HEART RATE: 62 BPM

## 2025-07-14 DIAGNOSIS — M23.91 INTERNAL DERANGEMENT OF RIGHT KNEE: Primary | ICD-10-CM

## 2025-07-14 DIAGNOSIS — M23.91 INTERNAL DERANGEMENT OF RIGHT KNEE: ICD-10-CM

## 2025-07-14 PROCEDURE — 73564 X-RAY EXAM KNEE 4 OR MORE: CPT | Performed by: NURSE PRACTITIONER

## 2025-07-14 NOTE — PROGRESS NOTES
Chief Complaint   Patient presents with    Knee Pain     Right knee       History of Present Illness  Ching Rhoades is a 74 year old female who presents with right knee pain.    She describes the right knee pain as having been very uncomfortable prior to today. This morning, after her shower, she felt something 'pop' in her knee while getting dressed, which provided some relief from the discomfort. Despite this, she still experiences some inflammation in the knee.    She rates her current knee pain as a 4 out of 10. There is noticeable swelling in the knee, but no redness. She cannot recall any specific injury to the knee. The swelling is more pronounced in the right knee compared to the left.    She has not been taking any prescription medications for the knee pain but uses Tylenol as needed. She prefers topical treatments over oral medications.    No ear problems reported.     .  Current Medications[1]   Past Medical History[2]   Past Surgical History[3]   Family History[4]   Short Social Hx on File[5]        ROS:  GEN: no fever, no chills, no fatigue  CHEST: no chest pains.  SKIN: no rashes  HEM: no ecchymoses  JOINTS: no other joints pain.  NEURO: no tingling, no weakness, no abnormal sensation.      /60   Pulse 62   Resp 16   Ht 5' 2\" (1.575 m)   Wt 116 lb (52.6 kg)   LMP  (LMP Unknown)   SpO2 98%   BMI 21.22 kg/m²     PE:  Gen:  WD/WN NAD  HEENT:  PEERLA, EOM-i.  LUNGS:CTA edyta.  HEART:S1/S2 reg., no murmurs, clicks, gallops  SKIN:no rashes on the chest or back.  Back:  Normal on inspection, no pain on palpation of the spinal and paraspinal muscles.   Neuro:  Reflexes at knees 2+ and symmetric  R KNEE: Anatomy appears normal upon inspection., No effusion present., No palpable Baker's cyst., Lachman's test, negative., Anterior drawer test, negative., Posterior drawer test, negative., Lateral collateral ligament intact., Medial collateral ligament intact., No lateral joint line tenderness., No  medial joint line tenderness., Percy's test negative , No pain with palp. of the pes anserine bursa.      A/P  Diagnoses and all orders for this visit:    Internal derangement of right knee  -     XR Knee (non-replaced) right complete (4 or more views) - [facility] Consult Only; Future  -     Ortho Referral - In Network  -     diclofenac 1 % External Gel; Apply 2 g topically 4 (four) times daily.  Assessment & Plan  Knee pain with swelling  Right knee pain with swelling, rated 4/10, possibly due to ligament or cartilage issue. Differential includes arthritis or ligament/cartilage displacement.  - Order right knee x-ray to assess for arthritis or structural issues.  - Refer to orthopedics for evaluation with specialized weight-bearing films.  - Prescribe topical anti-inflammatory cream, apply four times daily.  - Advise warm or cold compresses for comfort.               [1]   Current Outpatient Medications   Medication Sig Dispense Refill    donepezil 10 MG Oral Tab Take 1 tablet (10 mg total) by mouth nightly. 90 tablet 1    amLODIPine 5 MG Oral Tab Take 1 tablet (5 mg total) by mouth daily. 90 tablet 3    alendronate 70 MG Oral Tab Take 1 tablet (70 mg total) by mouth once a week. 12 tablet 3    hydrALAZINE 25 MG Oral Tab Take 1 tablet (25 mg total) by mouth 3 (three) times daily as needed (for SBP above 160). (Patient not taking: Reported on 1/10/2025) 90 tablet 0    Probiotic Product (PROBIOTIC OR) Take by mouth.     [2]   Past Medical History:   Abnormal LFTs    Alzheimer disease (HCC)    Arthritis of right knee    severe    Brain atrophy    Mri 2014    Closed fracture of distal end of right radius  Global 01/26/2020    Dizziness    Dyslipidemia    Fatigue    Foot fracture, left    4th toe fx 4.2015    Heart palpitations    Herpes    HTN (hypertension)    Leaking of urine    Loss of appetite    TOD (obstructive sleep apnea)    S/P right knee arthroscopy    Visual impairment    Wears glasses    Weight gain    [3]   Past Surgical History:  Procedure Laterality Date    Arthroscopy of joint unlisted Right     Knee    Colonoscopy      Correct bunion,othr methods Right    [4]   Family History  Problem Relation Age of Onset    Diabetes Father     Hypertension Mother     Breast Cancer Sister 61    Diabetes Sister     Hypertension Sister     Diabetes Paternal Grandfather    [5]   Social History  Socioeconomic History    Marital status:    Tobacco Use    Smoking status: Never    Smokeless tobacco: Never   Vaping Use    Vaping status: Never Used   Substance and Sexual Activity    Alcohol use: No     Alcohol/week: 0.0 standard drinks of alcohol    Drug use: No   Other Topics Concern    Blood Transfusions No    Caffeine Concern Yes     Comment: 1 cup of coffee daily; ice tea on occassions    Sleep Concern No    Stress Concern No    Weight Concern No    Special Diet No    Back Care No    Exercise Yes     Comment: active with grandchildren    Bike Helmet No    Seat Belt Yes    Self-Exams No     Social Drivers of Health      Received from [facility]    Housing Stability

## 2025-07-14 NOTE — PROGRESS NOTES
The following individual(s) verbally consented to be recorded using ambient AI listening technology and understand that they can each withdraw their consent to this listening technology at any point by asking the clinician to turn off or pause the recording:    Patient name: Ching Callawaydax  Jacqui Daughter

## 2025-07-21 ENCOUNTER — TELEPHONE (OUTPATIENT)
Dept: FAMILY MEDICINE CLINIC | Facility: CLINIC | Age: 75
End: 2025-07-21

## 2025-08-08 ENCOUNTER — TELEPHONE (OUTPATIENT)
Dept: FAMILY MEDICINE CLINIC | Facility: CLINIC | Age: 75
End: 2025-08-08

## 2025-08-08 DIAGNOSIS — I10 PRIMARY HYPERTENSION: Primary | ICD-10-CM

## 2025-08-08 DIAGNOSIS — Z00.00 ENCOUNTER FOR ANNUAL HEALTH EXAMINATION: ICD-10-CM

## (undated) DIAGNOSIS — F02.80 EARLY ONSET ALZHEIMER'S DEMENTIA WITHOUT BEHAVIORAL DISTURBANCE (HCC): ICD-10-CM

## (undated) DIAGNOSIS — G30.0 EARLY ONSET ALZHEIMER'S DEMENTIA WITHOUT BEHAVIORAL DISTURBANCE (HCC): ICD-10-CM

## (undated) DEVICE — DRILL BIT 2.2MM

## (undated) DEVICE — ESSENTIAL SHOULDER SLING M

## (undated) DEVICE — CAST TAPE SYNTH 3

## (undated) DEVICE — ZIMMER® STERILE DISPOSABLE TOURNIQUET CUFF WITH PLC, DUAL PORT, SINGLE BLADDER, 18 IN. (46 CM)

## (undated) DEVICE — UPPER EXTREMITY CDS-LF: Brand: MEDLINE INDUSTRIES, INC.

## (undated) DEVICE — CAST TAPE SYNTH 2

## (undated) DEVICE — SUTURE ETHILON 5-0 PS-3

## (undated) DEVICE — STERILE POLYISOPRENE POWDER-FREE SURGICAL GLOVES: Brand: PROTEXIS

## (undated) DEVICE — SUTURE VICRYL 2-0 FS-1

## (undated) DEVICE — NON-ADHERENT STRIPS,OIL EMULSION: Brand: CURITY

## (undated) DEVICE — STOCKINETTE HYDROMED 3\" 703033

## (undated) DEVICE — GAMMEX® PI HYBRID SIZE 8.5, STERILE POWDER-FREE SURGICAL GLOVE, POLYISOPRENE AND NEOPRENE BLEND: Brand: GAMMEX

## (undated) DEVICE — SOL  .9 1000ML BTL

## (undated) DEVICE — KENDALL SCD EXPRESS SLEEVES, KNEE LENGTH, MEDIUM: Brand: KENDALL SCD

## (undated) DEVICE — DRAPE C-ARM UNIVERSAL

## (undated) DEVICE — REM POLYHESIVE ADULT PATIENT RETURN ELECTRODE: Brand: VALLEYLAB

## (undated) NOTE — LETTER
08/12/19        [facility]  [address]  [place] [address]      Dear Rachelle Morales,    [facility] records indicate that you have outstanding lab work and or testing that was ordered for you and has not yet been completed:  Orders Placed This E

## (undated) NOTE — MR AVS SNAPSHOT
[facility] [address]  [address] [place] [facility] [place]  [phone]               Thank you for choosing us for your health care visit with Regulo Michelle MD.  We are glad to serve you and happy to provide you with •Cardiac Testing in ER building Building A second floor Cardiac Testing  (For example, Holter Monitor, Cardiac Stress tests, 2d Echocardiograms)  •[facility] call [phone] usually in dg A    • Please call our office about any Allergies as of Mar 07, 2017     Diovan [Valsartan]     Sulfa Antibiotics     tachycardia                Today's Vital Signs     BP Pulse Temp Height Weight BMI    130/70 mmHg 108 98.6 °F (37 °C) (Temporal) 62\" 117 lb 21.39 kg/m2         Current [employer]

## (undated) NOTE — LETTER
07/21/25        Ching Rhoades  [address] [facility] [address]      Dear Ching,    Our records indicate that you have outstanding lab work and or testing that was ordered for you and has not yet been completed:  Annual physical and mammogram are due . Please call the office at   [phone] to schedule your appointment .     To provide you with the best possible care, please complete these orders at your earliest convenience. If you have recently completed these orders please disregard this letter.     If you have any questions please call the office at No information on file..     Thank you,       Not in an encounter context.

## (undated) NOTE — LETTER
05/01/18        [facility]  [address]  [place] [address]      Dear Robert Matthew,    [address] records indicate that you have outstanding lab work and or testing that was ordered for you and has not yet been completed:          CBC W Differ

## (undated) NOTE — ED AVS SNAPSHOT
[facility] in [address] [place], [place] [address]  Phone:  [phone]  Fax:  [phone]          Grace Delacruz   MRN: [id number]    Department:  [facility] in [place]   Date of Visit:  7/1/20 IF THERE IS ANY CHANGE OR WORSENING OF YOUR CONDITION, CALL YOUR PRIMARY CARE PHYSICIAN AT ONCE OR RETURN IMMEDIATELY TO THE EMERGENCY DEPARTMENT.     If you have been prescribed any medication(s), please fill your prescription right away and begin taking t

## (undated) NOTE — LETTER
03/23/21        [facility]  [address]  [place] [address]      Dear Anival Skelton,    [address] records indicate that you have outstanding lab work and or testing that was ordered for you and has not yet been completed:  Orders Placed This Encoun

## (undated) NOTE — MR AVS SNAPSHOT
[address] [facility] [address]  [phone]               Thank you for choosing us for your health care visit with Sherie Welch MD.  We are glad to serve you and happy to provide you with this summary of your Diagnoses:  Colon cancer screening   Abnormal LFTs   Hepatic lesion   Order:  Meggan Nair - Internal    Leonora Mustafa, [address] [address]   Phone:  [phone]   Fax:  [phone]         Referral Orders      Normal Orders This Date Last Reviewed: 10/11/2015  © 0653-2709 [address], [address] [place], [place] [address]. All rights reserved. This information is not intended as a substitute for professional medical care.  Always follow your healthcare Edwyna Nap You should feel comfortable and relaxed in this position. · Pull one knee to your chest.  · Hold for 30 to 60 seconds. Return to starting position. · Repeat 2 times. · Switch legs. · For a double leg pull, pull both legs to your chest at the same time. discharge instructions in [employer] by going to Visits < Admission Summaries. If you've been to the Emergency Department or your doctor's office, you can view your past visit information in [employer] by going to Visits < Visit Summaries. real5D questions?

## (undated) NOTE — ED AVS SNAPSHOT
Radha Prescott   MRN: [id number]    Department:  [facility] in [place]   Date of Visit:  10/27/2017           Disclosure     Insurance plans vary and the physician(s) referred by the ER may not be covered by your plan.  Please con If you have been prescribed any medication(s), please fill your prescription right away and begin taking the medication(s) as directed    If the emergency physician has read X-rays, these will be re-interpreted by a radiologist.  If there is a significant

## (undated) NOTE — ED AVS SNAPSHOT
Fiordaliza Melody   MRN: [id number]    Department:  [facility] [place]   Date of Visit:  10/23/2019           Disclosure     Insurance plans vary and the physician(s) referred by the ER may not be covered by your plan.  Please con tell this physician (or your personal doctor if your instructions are to return to your personal doctor) about any new or lasting problems. The primary care or specialist physician will see patients referred from the [facility] Emergency Department.  Wes Villegas

## (undated) NOTE — MR AVS SNAPSHOT
[facility]  [address]  [phone]               Thank you for choosing us for your health care visit with Swetha Bay PT.   We are glad to serve you and happy to provide you with this summ If you've recently had a stay at the Hospital you can access your discharge instructions in [employer] by going to Visits < Admission Summaries.  If you've been to the Emergency Department or your doctor's office, you can view your past visit information in My

## (undated) NOTE — ED AVS SNAPSHOT
[facility] in [address] [place] [address] Drive,[address] Floor    [facility]    Phone:  [phone]    Fax:  [phone]           Brenda Patricio   MRN: [id number]    Department:  [facility] END   Date of Visit:  2/2/2017 - naproxen 500 MG Tabs              Discharge Instructions       Please return to the Emergency department/clinic if symptoms worsen. Follow up with your primary care physician in 1-2 days as needed.  Take any medications prescribed to you as instructed an You were examined and treated today on an urgent basis only. This was not a substitute for ongoing medical care. Often, one Immediate Care visit does not uncover every injury or illness.  If you have been referred to a primary care or a specialist physicia Lizzy Devine ([address]) [phone] [address]) [phone]                Additional Information       We are concerned for your overall well being:    - If you are a smoker o

## (undated) NOTE — MR AVS SNAPSHOT
[facility] [address]  [facility]  [phone]               Thank you for choosing us for your health care visit with Dewayne Arvizu PA-C.   We are glad to serve you and happy to provide you •Walk in Clinic in [place] at [facility].  [address] Mon-Fri at 8am-7:30pm, and Sat/Sun 9am-430pm  Also at [address]  Call [phone] for info    • Please call our office about any questions regarding your treatment/medicines/tests as a re Return in about 4 weeks (around 3/6/2017).       Allergies as of Feb 06, 2017     Diovan [Valsartan]     Sulfa Antibiotics     tachycardia                Today's Vital Signs     BP Pulse Temp Height Weight BMI    126/82 mmHg 82 98.1 °F (36.7 °C) (Oral) 62\ Your physician has recommended you to [facility]. If your insurance requires you to obtain a managed care referral, please allow 3 working days from the date of this order for the referral to be processed.   Please wait 3 working days to sched